# Patient Record
Sex: MALE | Race: WHITE | NOT HISPANIC OR LATINO | Employment: FULL TIME | URBAN - METROPOLITAN AREA
[De-identification: names, ages, dates, MRNs, and addresses within clinical notes are randomized per-mention and may not be internally consistent; named-entity substitution may affect disease eponyms.]

---

## 2023-02-25 PROBLEM — E66.811 CLASS 1 OBESITY WITH BODY MASS INDEX (BMI) OF 30.0 TO 30.9 IN ADULT: Status: ACTIVE | Noted: 2023-02-25

## 2023-02-25 PROBLEM — S46.212A RUPTURE OF LEFT DISTAL BICEPS TENDON: Status: ACTIVE | Noted: 2023-02-25

## 2023-02-25 PROBLEM — E11.9 CONTROLLED DIABETES MELLITUS TYPE II WITHOUT COMPLICATION (MULTI): Status: ACTIVE | Noted: 2023-02-25

## 2023-02-25 PROBLEM — M75.82 ROTATOR CUFF TENDINITIS, LEFT: Status: ACTIVE | Noted: 2023-02-25

## 2023-02-25 PROBLEM — E66.9 CLASS 1 OBESITY WITH BODY MASS INDEX (BMI) OF 30.0 TO 30.9 IN ADULT: Status: ACTIVE | Noted: 2023-02-25

## 2023-02-25 PROBLEM — M75.42 IMPINGEMENT SYNDROME OF LEFT SHOULDER: Status: ACTIVE | Noted: 2023-02-25

## 2023-02-25 PROBLEM — D17.9 LIPOMA: Status: ACTIVE | Noted: 2023-02-25

## 2023-02-25 PROBLEM — M54.50 CHRONIC LOW BACK PAIN: Status: ACTIVE | Noted: 2023-02-25

## 2023-02-25 PROBLEM — G89.29 CHRONIC LOW BACK PAIN: Status: ACTIVE | Noted: 2023-02-25

## 2023-02-25 PROBLEM — E78.5 HYPERLIPIDEMIA, UNSPECIFIED: Status: ACTIVE | Noted: 2023-02-25

## 2023-02-25 RX ORDER — ATORVASTATIN CALCIUM 10 MG/1
1 TABLET, FILM COATED ORAL NIGHTLY
COMMUNITY
Start: 2020-01-21 | End: 2023-03-07 | Stop reason: SDUPTHER

## 2023-02-25 RX ORDER — GLIPIZIDE 5 MG/1
1 TABLET, FILM COATED, EXTENDED RELEASE ORAL
COMMUNITY
Start: 2020-01-21 | End: 2023-03-07 | Stop reason: SDUPTHER

## 2023-02-25 RX ORDER — METFORMIN HYDROCHLORIDE 500 MG/1
1 TABLET, EXTENDED RELEASE ORAL DAILY
COMMUNITY
End: 2023-03-07

## 2023-02-25 RX ORDER — MELOXICAM 15 MG/1
1 TABLET ORAL DAILY
COMMUNITY
End: 2023-03-07 | Stop reason: SDUPTHER

## 2023-02-25 RX ORDER — PEN NEEDLE, DIABETIC 30 GX3/16"
NEEDLE, DISPOSABLE MISCELLANEOUS
COMMUNITY

## 2023-02-25 RX ORDER — INSULIN GLARGINE 100 [IU]/ML
INJECTION, SOLUTION SUBCUTANEOUS
COMMUNITY
Start: 2021-08-19 | End: 2023-03-07 | Stop reason: SDUPTHER

## 2023-02-25 RX ORDER — CYCLOBENZAPRINE HCL 10 MG
1 TABLET ORAL NIGHTLY PRN
COMMUNITY
Start: 2020-05-12 | End: 2023-03-07 | Stop reason: SDUPTHER

## 2023-03-07 ENCOUNTER — OFFICE VISIT (OUTPATIENT)
Dept: PRIMARY CARE | Facility: CLINIC | Age: 48
End: 2023-03-07
Payer: COMMERCIAL

## 2023-03-07 VITALS
TEMPERATURE: 97.7 F | WEIGHT: 217 LBS | HEIGHT: 70 IN | OXYGEN SATURATION: 96 % | RESPIRATION RATE: 16 BRPM | SYSTOLIC BLOOD PRESSURE: 129 MMHG | BODY MASS INDEX: 31.07 KG/M2 | HEART RATE: 86 BPM | DIASTOLIC BLOOD PRESSURE: 91 MMHG

## 2023-03-07 DIAGNOSIS — G89.29 CHRONIC LOW BACK PAIN WITHOUT SCIATICA, UNSPECIFIED BACK PAIN LATERALITY: ICD-10-CM

## 2023-03-07 DIAGNOSIS — E78.5 HYPERLIPIDEMIA, UNSPECIFIED HYPERLIPIDEMIA TYPE: ICD-10-CM

## 2023-03-07 DIAGNOSIS — M54.50 CHRONIC LOW BACK PAIN WITHOUT SCIATICA, UNSPECIFIED BACK PAIN LATERALITY: ICD-10-CM

## 2023-03-07 DIAGNOSIS — E11.9 CONTROLLED TYPE 2 DIABETES MELLITUS WITHOUT COMPLICATION, WITHOUT LONG-TERM CURRENT USE OF INSULIN (MULTI): Primary | ICD-10-CM

## 2023-03-07 DIAGNOSIS — R74.01 ELEVATED AST (SGOT): ICD-10-CM

## 2023-03-07 PROBLEM — M75.82 ROTATOR CUFF TENDINITIS, LEFT: Status: RESOLVED | Noted: 2023-02-25 | Resolved: 2023-03-07

## 2023-03-07 PROBLEM — D17.9 LIPOMA: Status: RESOLVED | Noted: 2023-02-25 | Resolved: 2023-03-07

## 2023-03-07 PROBLEM — M75.42 IMPINGEMENT SYNDROME OF LEFT SHOULDER: Status: RESOLVED | Noted: 2023-02-25 | Resolved: 2023-03-07

## 2023-03-07 PROBLEM — D17.9 LIPOMA: Chronic | Status: ACTIVE | Noted: 2023-02-25

## 2023-03-07 PROBLEM — S46.212A RUPTURE OF LEFT DISTAL BICEPS TENDON: Status: RESOLVED | Noted: 2023-02-25 | Resolved: 2023-03-07

## 2023-03-07 PROCEDURE — 99214 OFFICE O/P EST MOD 30 MIN: CPT

## 2023-03-07 PROCEDURE — 3074F SYST BP LT 130 MM HG: CPT

## 2023-03-07 PROCEDURE — 3080F DIAST BP >= 90 MM HG: CPT

## 2023-03-07 RX ORDER — GLIPIZIDE 5 MG/1
5 TABLET, FILM COATED, EXTENDED RELEASE ORAL
Qty: 90 TABLET | Refills: 1 | Status: SHIPPED | OUTPATIENT
Start: 2023-03-07 | End: 2023-05-10 | Stop reason: SDUPTHER

## 2023-03-07 RX ORDER — MELOXICAM 15 MG/1
15 TABLET ORAL DAILY
Qty: 90 TABLET | Refills: 1 | Status: SHIPPED | OUTPATIENT
Start: 2023-03-07 | End: 2023-05-10 | Stop reason: SDUPTHER

## 2023-03-07 RX ORDER — METFORMIN HYDROCHLORIDE 500 MG/1
500 TABLET, EXTENDED RELEASE ORAL DAILY
Qty: 90 TABLET | Refills: 1 | Status: CANCELLED | OUTPATIENT
Start: 2023-03-07

## 2023-03-07 RX ORDER — METFORMIN HYDROCHLORIDE 1000 MG/1
1000 TABLET ORAL
Qty: 180 TABLET | Refills: 3 | Status: SHIPPED | OUTPATIENT
Start: 2023-03-07 | End: 2024-01-22 | Stop reason: SDUPTHER

## 2023-03-07 RX ORDER — ATORVASTATIN CALCIUM 10 MG/1
10 TABLET, FILM COATED ORAL NIGHTLY
Qty: 90 TABLET | Refills: 1 | Status: SHIPPED | OUTPATIENT
Start: 2023-03-07 | End: 2023-10-11

## 2023-03-07 RX ORDER — INSULIN GLARGINE 100 [IU]/ML
60 INJECTION, SOLUTION SUBCUTANEOUS NIGHTLY
Qty: 15 EACH | Refills: 1 | Status: SHIPPED | OUTPATIENT
Start: 2023-03-07 | End: 2023-05-10 | Stop reason: SDUPTHER

## 2023-03-07 RX ORDER — CYCLOBENZAPRINE HCL 10 MG
10 TABLET ORAL NIGHTLY PRN
Qty: 90 TABLET | Refills: 1 | Status: SHIPPED | OUTPATIENT
Start: 2023-03-07 | End: 2024-04-23 | Stop reason: SDUPTHER

## 2023-03-07 ASSESSMENT — LIFESTYLE VARIABLES
AUDIT-C TOTAL SCORE: 3
HOW OFTEN DO YOU HAVE A DRINK CONTAINING ALCOHOL: 2-3 TIMES A WEEK
HOW MANY STANDARD DRINKS CONTAINING ALCOHOL DO YOU HAVE ON A TYPICAL DAY: 1 OR 2
SKIP TO QUESTIONS 9-10: 1
HOW OFTEN DO YOU HAVE SIX OR MORE DRINKS ON ONE OCCASION: NEVER

## 2023-03-07 ASSESSMENT — PATIENT HEALTH QUESTIONNAIRE - PHQ9
2. FEELING DOWN, DEPRESSED OR HOPELESS: NOT AT ALL
SUM OF ALL RESPONSES TO PHQ9 QUESTIONS 1 & 2: 0
1. LITTLE INTEREST OR PLEASURE IN DOING THINGS: NOT AT ALL

## 2023-03-07 ASSESSMENT — ENCOUNTER SYMPTOMS
CARDIOVASCULAR NEGATIVE: 1
LOSS OF SENSATION IN FEET: 0
OCCASIONAL FEELINGS OF UNSTEADINESS: 0
MUSCULOSKELETAL NEGATIVE: 1
DIABETIC ASSOCIATED SYMPTOMS: 0
PSYCHIATRIC NEGATIVE: 1
ENDOCRINE NEGATIVE: 1
NEUROLOGICAL NEGATIVE: 1
EYES NEGATIVE: 1
CONSTITUTIONAL NEGATIVE: 1
GASTROINTESTINAL NEGATIVE: 1
HEMATOLOGIC/LYMPHATIC NEGATIVE: 1
RESPIRATORY NEGATIVE: 1
DEPRESSION: 0

## 2023-03-07 NOTE — PATIENT INSTRUCTIONS
Thank you for coming to see me today.  If you have any questions or concerns following our visit, please contact the office.  Phone: [phone]    Follow up with me in 4-6 weeks or sooner as needed    1) START metformin 1000 mg twice a day with meals    2) DECREASE lantus to 45 units nightly    3) Get fasting labs within the next week

## 2023-03-07 NOTE — PROGRESS NOTES
"Subjective   Patient ID: Herman Fairbanks is a 47 y.o. male who presents for visit to establish care, hyperlipidemia and DM2 follow up.     Diabetes  He presents for his follow-up diabetic visit. He has type 2 diabetes mellitus. The initial diagnosis of diabetes was made 4 years ago. His disease course has been stable. There are no hypoglycemic associated symptoms. There are no diabetic associated symptoms. There are no hypoglycemic complications. Symptoms are stable.      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 Occasional smoked meats and chicken wings. Occasional candy, avoiding sodas  Exercise: On his feet a lot at work, cuts grass and shovels snow on his property. Walks the dogs  Sleep: Not as good lately, getting about 4 hours then up to use bathroom then sometimes having trouble getting back to sleep. 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    HENT: Negative.     Eyes: Negative.    Respiratory: Negative.     Cardiovascular: Negative.    Gastrointestinal: Negative.    Endocrine: Negative.    Genitourinary: Negative.    Musculoskeletal: Negative.    Skin: Negative.    Neurological: Negative.    Hematological: Negative.    Psychiatric/Behavioral: Negative.         Objective   BP (!) 129/91 (BP Location: Left arm, Patient Position: Sitting, BP Cuff Size: Large adult)   Pulse 86   Temp 36.5 °C (97.7 °F)   Resp 16   Ht 1.765 m (5' 9.5\")   Wt 98.4 kg (217 lb)   SpO2 96%   BMI 31.59 kg/m²     Physical Exam  HENT:      Head: Normocephalic and " atraumatic.      Right Ear: Tympanic membrane and ear canal normal.      Left Ear: Tympanic membrane and ear canal normal.      Nose: Nose normal. No congestion or rhinorrhea.   Eyes:      Extraocular Movements: Extraocular movements intact.      Conjunctiva/sclera: Conjunctivae normal.      Pupils: Pupils are equal, round, and reactive to light.   Neck:      Vascular: No carotid bruit.   Cardiovascular:      Rate and Rhythm: Normal rate and regular rhythm.      Pulses: Normal pulses.      Heart sounds: Normal heart sounds.   Pulmonary:      Effort: Pulmonary effort is normal.      Breath sounds: Normal breath sounds.   Abdominal:      General: Bowel sounds are normal.      Palpations: Abdomen is soft.   Musculoskeletal:         General: Normal range of motion.      Cervical back: Normal range of motion.      Comments: Right middle finger DIP amputated   Skin:     General: Skin is warm and dry.   Neurological:      General: No focal deficit present.      Mental Status: He is alert and oriented to person, place, and time.   Psychiatric:         Mood and Affect: Mood normal.         Behavior: Behavior normal.         Assessment/Plan   Problem List Items Addressed This Visit       Chronic low back pain     Well controlled on cyclobenzaprine PRN and meloxicam 15mg daily         Relevant Medications    cyclobenzaprine (Flexeril) 10 mg tablet    meloxicam (Mobic) 15 mg tablet    Controlled diabetes mellitus type II without complication (CMS/HCC) - Primary     Increase metformin to 1000mg twice a day  Decrease lantus to 45 units daily currently taking 60 units daily  Will try to get him off insulin and on better GDMT for DM2 such as SGLT2i and GLP-1 RA         Relevant Medications    atorvastatin (Lipitor) 10 mg tablet    glipiZIDE XL (Glucotrol XL) 5 mg 24 hr tablet    insulin glargine (Lantus) 100 unit/mL (3 mL) pen    metFORMIN (Glucophage) 1,000 mg tablet    Other Relevant Orders    Follow Up In Primary Care     Hemoglobin A1c    CBC    Comprehensive Metabolic Panel    Hyperlipidemia, unspecified     Continue atorvastatin 10mg daily  Repeat fasting lipid panel         Relevant Medications    atorvastatin (Lipitor) 10 mg tablet    Other Relevant Orders    Lipid Panel

## 2023-03-07 NOTE — ASSESSMENT & PLAN NOTE
Increase metformin to 1000mg twice a day  Decrease lantus to 45 units daily currently taking 60 units daily  Will try to get him off insulin and on better GDMT for DM2 such as SGLT2i and GLP-1 RA

## 2023-03-09 ENCOUNTER — LAB (OUTPATIENT)
Dept: LAB | Facility: LAB | Age: 48
End: 2023-03-09
Payer: COMMERCIAL

## 2023-03-09 ENCOUNTER — APPOINTMENT (OUTPATIENT)
Dept: LAB | Facility: LAB | Age: 48
End: 2023-03-09
Payer: COMMERCIAL

## 2023-03-09 ENCOUNTER — TELEPHONE (OUTPATIENT)
Dept: PRIMARY CARE | Facility: CLINIC | Age: 48
End: 2023-03-09

## 2023-03-09 DIAGNOSIS — E11.9 CONTROLLED TYPE 2 DIABETES MELLITUS WITHOUT COMPLICATION, WITHOUT LONG-TERM CURRENT USE OF INSULIN (MULTI): ICD-10-CM

## 2023-03-09 LAB
ALANINE AMINOTRANSFERASE (SGPT) (U/L) IN SER/PLAS: 86 U/L (ref 10–52)
ALBUMIN (G/DL) IN SER/PLAS: 4.5 G/DL (ref 3.4–5)
ALKALINE PHOSPHATASE (U/L) IN SER/PLAS: 43 U/L (ref 33–120)
ANION GAP IN SER/PLAS: 10 MMOL/L (ref 10–20)
ASPARTATE AMINOTRANSFERASE (SGOT) (U/L) IN SER/PLAS: 44 U/L (ref 9–39)
BILIRUBIN TOTAL (MG/DL) IN SER/PLAS: 0.8 MG/DL (ref 0–1.2)
CALCIUM (MG/DL) IN SER/PLAS: 9.3 MG/DL (ref 8.6–10.3)
CARBON DIOXIDE, TOTAL (MMOL/L) IN SER/PLAS: 25 MMOL/L (ref 21–32)
CHLORIDE (MMOL/L) IN SER/PLAS: 105 MMOL/L (ref 98–107)
CHOLESTEROL (MG/DL) IN SER/PLAS: 150 MG/DL (ref 0–199)
CHOLESTEROL IN HDL (MG/DL) IN SER/PLAS: 46.9 MG/DL
CHOLESTEROL/HDL RATIO: 3.2
CREATININE (MG/DL) IN SER/PLAS: 0.9 MG/DL (ref 0.5–1.3)
ERYTHROCYTE DISTRIBUTION WIDTH (RATIO) BY AUTOMATED COUNT: 13.1 % (ref 11.5–14.5)
ERYTHROCYTE MEAN CORPUSCULAR HEMOGLOBIN CONCENTRATION (G/DL) BY AUTOMATED: 33.8 G/DL (ref 32–36)
ERYTHROCYTE MEAN CORPUSCULAR VOLUME (FL) BY AUTOMATED COUNT: 88 FL (ref 80–100)
ERYTHROCYTES (10*6/UL) IN BLOOD BY AUTOMATED COUNT: 5.7 X10E12/L (ref 4.5–5.9)
GFR MALE: >90 ML/MIN/1.73M2
GLUCOSE (MG/DL) IN SER/PLAS: 150 MG/DL (ref 74–99)
HEMATOCRIT (%) IN BLOOD BY AUTOMATED COUNT: 50 % (ref 41–52)
HEMOGLOBIN (G/DL) IN BLOOD: 16.9 G/DL (ref 13.5–17.5)
LDL: 84 MG/DL (ref 0–99)
LEUKOCYTES (10*3/UL) IN BLOOD BY AUTOMATED COUNT: 6.1 X10E9/L (ref 4.4–11.3)
PLATELETS (10*3/UL) IN BLOOD AUTOMATED COUNT: 169 X10E9/L (ref 150–450)
POTASSIUM (MMOL/L) IN SER/PLAS: 4.2 MMOL/L (ref 3.5–5.3)
PROTEIN TOTAL: 7.2 G/DL (ref 6.4–8.2)
SODIUM (MMOL/L) IN SER/PLAS: 136 MMOL/L (ref 136–145)
TRIGLYCERIDE (MG/DL) IN SER/PLAS: 94 MG/DL (ref 0–149)
UREA NITROGEN (MG/DL) IN SER/PLAS: 21 MG/DL (ref 6–23)
VLDL: 19 MG/DL (ref 0–40)

## 2023-03-09 PROCEDURE — 80061 LIPID PANEL: CPT

## 2023-03-09 PROCEDURE — 83036 HEMOGLOBIN GLYCOSYLATED A1C: CPT

## 2023-03-09 PROCEDURE — 85027 COMPLETE CBC AUTOMATED: CPT

## 2023-03-09 PROCEDURE — 80053 COMPREHEN METABOLIC PANEL: CPT

## 2023-03-09 PROCEDURE — 36415 COLL VENOUS BLD VENIPUNCTURE: CPT

## 2023-03-09 NOTE — TELEPHONE ENCOUNTER
Pt came into office stated he needs his test strips for glucose meter, meter is true Metrix.   Pt's pharamacy is [facility] in [place].

## 2023-03-10 DIAGNOSIS — E11.9 CONTROLLED TYPE 2 DIABETES MELLITUS WITHOUT COMPLICATION, WITHOUT LONG-TERM CURRENT USE OF INSULIN (MULTI): Primary | ICD-10-CM

## 2023-03-10 LAB
ESTIMATED AVERAGE GLUCOSE FOR HBA1C: 160 MG/DL
HEMOGLOBIN A1C/HEMOGLOBIN TOTAL IN BLOOD: 7.2 %

## 2023-03-10 NOTE — RESULT ENCOUNTER NOTE
Please let him know A1c elevated to 7.2- needs to work on getting more exercise and cutting out sugary foods.  Still want him to increase metformin and decrease lantus as discussed in office due to low glucose overnight/in the morning

## 2023-04-17 ENCOUNTER — LAB (OUTPATIENT)
Dept: LAB | Facility: LAB | Age: 48
End: 2023-04-17
Payer: COMMERCIAL

## 2023-04-17 DIAGNOSIS — R74.01 ELEVATED AST (SGOT): ICD-10-CM

## 2023-04-17 LAB
ALANINE AMINOTRANSFERASE (SGPT) (U/L) IN SER/PLAS: 87 U/L (ref 10–52)
ALBUMIN (G/DL) IN SER/PLAS: 4.4 G/DL (ref 3.4–5)
ALKALINE PHOSPHATASE (U/L) IN SER/PLAS: 45 U/L (ref 33–120)
ASPARTATE AMINOTRANSFERASE (SGOT) (U/L) IN SER/PLAS: 35 U/L (ref 9–39)
BILIRUBIN DIRECT (MG/DL) IN SER/PLAS: 0.1 MG/DL (ref 0–0.3)
BILIRUBIN TOTAL (MG/DL) IN SER/PLAS: 0.6 MG/DL (ref 0–1.2)
PROTEIN TOTAL: 7 G/DL (ref 6.4–8.2)

## 2023-04-17 PROCEDURE — 80076 HEPATIC FUNCTION PANEL: CPT

## 2023-04-17 PROCEDURE — 36415 COLL VENOUS BLD VENIPUNCTURE: CPT

## 2023-04-18 ENCOUNTER — APPOINTMENT (OUTPATIENT)
Dept: PRIMARY CARE | Facility: CLINIC | Age: 48
End: 2023-04-18
Payer: COMMERCIAL

## 2023-05-10 ENCOUNTER — OFFICE VISIT (OUTPATIENT)
Dept: PRIMARY CARE | Facility: CLINIC | Age: 48
End: 2023-05-10
Payer: COMMERCIAL

## 2023-05-10 VITALS
WEIGHT: 224 LBS | DIASTOLIC BLOOD PRESSURE: 80 MMHG | SYSTOLIC BLOOD PRESSURE: 135 MMHG | TEMPERATURE: 97.4 F | HEIGHT: 70 IN | RESPIRATION RATE: 16 BRPM | OXYGEN SATURATION: 99 % | HEART RATE: 78 BPM | BODY MASS INDEX: 32.07 KG/M2

## 2023-05-10 DIAGNOSIS — E11.9 CONTROLLED TYPE 2 DIABETES MELLITUS WITHOUT COMPLICATION, WITHOUT LONG-TERM CURRENT USE OF INSULIN (MULTI): ICD-10-CM

## 2023-05-10 DIAGNOSIS — G89.29 CHRONIC LOW BACK PAIN WITHOUT SCIATICA, UNSPECIFIED BACK PAIN LATERALITY: ICD-10-CM

## 2023-05-10 DIAGNOSIS — M54.50 CHRONIC LOW BACK PAIN WITHOUT SCIATICA, UNSPECIFIED BACK PAIN LATERALITY: ICD-10-CM

## 2023-05-10 PROCEDURE — 3079F DIAST BP 80-89 MM HG: CPT

## 2023-05-10 PROCEDURE — 99213 OFFICE O/P EST LOW 20 MIN: CPT

## 2023-05-10 PROCEDURE — 95250 CONT GLUC MNTR PHYS/QHP EQP: CPT

## 2023-05-10 PROCEDURE — 3051F HG A1C>EQUAL 7.0%<8.0%: CPT

## 2023-05-10 PROCEDURE — 3075F SYST BP GE 130 - 139MM HG: CPT

## 2023-05-10 RX ORDER — GLIPIZIDE 10 MG/1
10 TABLET, FILM COATED, EXTENDED RELEASE ORAL DAILY
Qty: 30 TABLET | Refills: 11 | Status: SHIPPED | OUTPATIENT
Start: 2023-05-10 | End: 2024-01-22 | Stop reason: SDUPTHER

## 2023-05-10 RX ORDER — INSULIN GLARGINE 100 [IU]/ML
30 INJECTION, SOLUTION SUBCUTANEOUS NIGHTLY
Qty: 12 EACH | Refills: 3 | Status: SHIPPED | OUTPATIENT
Start: 2023-05-10 | End: 2023-08-17 | Stop reason: SDUPTHER

## 2023-05-10 RX ORDER — GLIPIZIDE 5 MG/1
10 TABLET, FILM COATED, EXTENDED RELEASE ORAL
Qty: 90 TABLET | Refills: 1 | Status: SHIPPED | OUTPATIENT
Start: 2023-05-10 | End: 2023-05-10 | Stop reason: ALTCHOICE

## 2023-05-10 RX ORDER — FLASH GLUCOSE SENSOR
KIT MISCELLANEOUS
Qty: 2 EACH | Refills: 11 | Status: SHIPPED | OUTPATIENT
Start: 2023-05-10 | End: 2023-06-08 | Stop reason: SDUPTHER

## 2023-05-10 RX ORDER — MELOXICAM 15 MG/1
15 TABLET ORAL DAILY
Qty: 90 TABLET | Refills: 1 | Status: SHIPPED | OUTPATIENT
Start: 2023-05-10 | End: 2024-01-22 | Stop reason: SDUPTHER

## 2023-05-10 ASSESSMENT — ENCOUNTER SYMPTOMS
CARDIOVASCULAR NEGATIVE: 1
EYES NEGATIVE: 1
NEUROLOGICAL NEGATIVE: 1
HEMATOLOGIC/LYMPHATIC NEGATIVE: 1
PSYCHIATRIC NEGATIVE: 1
RESPIRATORY NEGATIVE: 1
ENDOCRINE NEGATIVE: 1
GASTROINTESTINAL NEGATIVE: 1
CONSTITUTIONAL NEGATIVE: 1
MUSCULOSKELETAL NEGATIVE: 1

## 2023-05-10 ASSESSMENT — PAIN SCALES - GENERAL: PAINLEVEL: 0-NO PAIN

## 2023-05-10 NOTE — PATIENT INSTRUCTIONS
Thank you for coming to see me today.  If you have any questions or concerns following our visit, please contact the office.  Phone: [phone]    Follow up with me in 6-8 weeks, call me/message me if issues with glucose prior to appointment    1)  DECREASE lantus to 30 units daily    2)  INCREASE Glipizide to 10mg daily

## 2023-05-10 NOTE — ASSESSMENT & PLAN NOTE
CGM FreeStyle Wili 3 started in-office today, will send order for sensors to pharmacy  Decrease lantus to 30 units daily  Increase glipizide to 10mg daily    **I spent 15 minutes providing individualized patient education regarding R/B/A of CGM, sensor placement, hook-up, monitoring, patient training, and sensor removal with the patient.

## 2023-05-10 NOTE — PROGRESS NOTES
"Subjective   Patient ID: Herman Fairbanks is a 47 y.o. male who presents for follow up of hyperglycemia.    Glucose readings running 140-150s at home since adding in metformin 1000mg BID and decreasing basaglar insulin to 45 units nightly.    Diet: Eating salads, wife cooks pretty healthy. Occasional smoked meats and chicken wings. Occasional candy, avoiding sodas  Exercise: On his feet a lot at work, cuts grass and shovels snow on his property. Walks the dogs  Sleep: Not as good lately, getting about 4 hours then up to use bathroom then sometimes having trouble getting back to sleep. 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    HENT: Negative.     Eyes: Negative.    Respiratory: Negative.     Cardiovascular: Negative.    Gastrointestinal: Negative.    Endocrine: Negative.    Genitourinary: Negative.    Musculoskeletal: Negative.    Skin: Negative.    Neurological: Negative.    Hematological: Negative.    Psychiatric/Behavioral: Negative.          Current Outpatient Medications   Medication Sig Dispense Refill    atorvastatin (Lipitor) 10 mg tablet Take 1 tablet (10 mg) by mouth once daily at bedtime. 90 tablet 1    blood sugar diagnostic strip For TrueMetrix glucometer. Check blood glucose daily as directed 100 strip 3    cyclobenzaprine (Flexeril) 10 mg tablet Take 1 tablet (10 mg) by mouth as needed at bedtime for muscle spasms. 90 tablet 1    metFORMIN (Glucophage) 1,000 mg tablet Take 1 tablet (1,000 mg) by mouth in the morning and 1 tablet (1,000 mg) in the evening. Take with meals. 180 tablet 3    pen needle, diabetic 32 gauge x 5/32\" needle To be used with lantus injections daily      FreeStyle Wili sensor system (FreeStyle Wili 14 Day Sensor) kit FreeStyle Wili 3. Use as instructed 2 each 11    glipiZIDE XL (Glucotrol XL) 10 mg 24 hr " "tablet Take 1 tablet (10 mg) by mouth once daily. Do not crush, chew, or split. 30 tablet 11    insulin glargine (Lantus) 100 unit/mL (3 mL) pen Inject 30 Units under the skin once daily at bedtime. 12 each 3    meloxicam (Mobic) 15 mg tablet Take 1 tablet (15 mg) by mouth once daily. 90 tablet 1     No current facility-administered medications for this visit.     Past Surgical History:   Procedure Laterality Date    CIRCUMCISION, PRIMARY  Sept77    OTHER SURGICAL HISTORY  01/02/2020    Tonsillectomy    OTHER SURGICAL HISTORY  08/17/2021    Biceps tendon rupture repair     Family History   Problem Relation Name Age of Onset    Hyperlipidemia Mother M     Hypertension Father D     Diabetes Maternal Grandmother G     Heart attack Maternal Grandfather      Sudden death Maternal Grandfather      Diabetes Paternal Grandmother G       Social History     Tobacco Use    Smoking status: Some Days     Types: Cigars    Smokeless tobacco: Former   Vaping Use    Vaping status: Never Used   Substance Use Topics    Alcohol use: Yes     Alcohol/week: 5.0 standard drinks of alcohol     Types: 5 Cans of beer per week    Drug use: Never        Objective     Visit Vitals  /80 (BP Location: Right arm, Patient Position: Sitting, BP Cuff Size: Adult)   Pulse 78   Temp 36.3 °C (97.4 °F) (Temporal)   Resp 16   Ht 1.778 m (5' 10\")   Wt 102 kg (224 lb)   SpO2 99%   BMI 32.14 kg/m²   Smoking Status Some Days   BSA 2.24 m²        Physical Exam  Constitutional:       Appearance: Normal appearance.   HENT:      Head: Normocephalic and atraumatic.   Cardiovascular:      Rate and Rhythm: Normal rate and regular rhythm.      Pulses: Normal pulses.      Heart sounds: Normal heart sounds.   Pulmonary:      Effort: Pulmonary effort is normal.      Breath sounds: Normal breath sounds.   Abdominal:      General: Abdomen is flat. Bowel sounds are normal.      Palpations: Abdomen is soft.   Musculoskeletal:         General: Normal range of motion. "   Neurological:      General: No focal deficit present.      Mental Status: He is alert and oriented to person, place, and time.   Psychiatric:         Mood and Affect: Mood normal.         Behavior: Behavior normal.           Assessment/Plan   Problem List Items Addressed This Visit       Chronic low back pain    Relevant Medications    meloxicam (Mobic) 15 mg tablet    Controlled diabetes mellitus type II without complication (CMS/HCC)     CGM FreeStyle Wili 3 started in-office today, will send order for sensors to pharmacy  Decrease lantus to 30 units daily  Increase glipizide to 10mg daily    **I spent 15 minutes providing individualized patient education regarding R/B/A of CGM, sensor placement, hook-up, monitoring, patient training, and sensor removal with the patient.           Relevant Medications    insulin glargine (Lantus) 100 unit/mL (3 mL) pen    FreeStyle Wili sensor system (FreeStyle Wili 14 Day Sensor) kit    glipiZIDE XL (Glucotrol XL) 10 mg 24 hr tablet    Other Relevant Orders    Follow Up In Primary Care       All pertinent lab work and results were reviewed with patient.     Follow up with me in 6-8 weeks     AVNI Weber-CNS

## 2023-05-24 ENCOUNTER — APPOINTMENT (OUTPATIENT)
Dept: PRIMARY CARE | Facility: CLINIC | Age: 48
End: 2023-05-24
Payer: COMMERCIAL

## 2023-06-05 ENCOUNTER — TELEPHONE (OUTPATIENT)
Dept: PRIMARY CARE | Facility: CLINIC | Age: 48
End: 2023-06-05
Payer: COMMERCIAL

## 2023-06-05 DIAGNOSIS — E11.9 CONTROLLED TYPE 2 DIABETES MELLITUS WITHOUT COMPLICATION, WITHOUT LONG-TERM CURRENT USE OF INSULIN (MULTI): ICD-10-CM

## 2023-06-05 NOTE — TELEPHONE ENCOUNTER
Prior authorization request received via fax for [employer]    Form given to: PLACED IN LEAD MA'S BOX ON 6/5/23

## 2023-06-08 RX ORDER — FLASH GLUCOSE SENSOR
KIT MISCELLANEOUS
Qty: 2 EACH | Refills: 11 | Status: SHIPPED | OUTPATIENT
Start: 2023-06-08 | End: 2023-06-22 | Stop reason: SDUPTHER

## 2023-07-17 ENCOUNTER — OFFICE VISIT (OUTPATIENT)
Dept: PRIMARY CARE | Facility: CLINIC | Age: 48
End: 2023-07-17
Payer: COMMERCIAL

## 2023-07-17 ENCOUNTER — LAB (OUTPATIENT)
Dept: LAB | Facility: LAB | Age: 48
End: 2023-07-17
Payer: COMMERCIAL

## 2023-07-17 VITALS
OXYGEN SATURATION: 99 % | BODY MASS INDEX: 32.28 KG/M2 | RESPIRATION RATE: 16 BRPM | TEMPERATURE: 96.4 F | SYSTOLIC BLOOD PRESSURE: 121 MMHG | DIASTOLIC BLOOD PRESSURE: 80 MMHG | HEART RATE: 74 BPM | WEIGHT: 225 LBS

## 2023-07-17 DIAGNOSIS — E11.9 CONTROLLED TYPE 2 DIABETES MELLITUS WITHOUT COMPLICATION, WITHOUT LONG-TERM CURRENT USE OF INSULIN (MULTI): ICD-10-CM

## 2023-07-17 DIAGNOSIS — R74.01 ELEVATED AST (SGOT): ICD-10-CM

## 2023-07-17 DIAGNOSIS — R74.01 ELEVATED AST (SGOT): Primary | ICD-10-CM

## 2023-07-17 LAB
ALANINE AMINOTRANSFERASE (SGPT) (U/L) IN SER/PLAS: 161 U/L (ref 10–52)
ALBUMIN (G/DL) IN SER/PLAS: 4.5 G/DL (ref 3.4–5)
ALBUMIN (MG/L) IN URINE: 9.8 MG/L
ALBUMIN/CREATININE (UG/MG) IN URINE: 4.9 UG/MG CRT (ref 0–30)
ALKALINE PHOSPHATASE (U/L) IN SER/PLAS: 49 U/L (ref 33–120)
ANION GAP IN SER/PLAS: 13 MMOL/L (ref 10–20)
ASPARTATE AMINOTRANSFERASE (SGOT) (U/L) IN SER/PLAS: 70 U/L (ref 9–39)
BILIRUBIN TOTAL (MG/DL) IN SER/PLAS: 0.5 MG/DL (ref 0–1.2)
CALCIUM (MG/DL) IN SER/PLAS: 9.2 MG/DL (ref 8.6–10.3)
CARBON DIOXIDE, TOTAL (MMOL/L) IN SER/PLAS: 27 MMOL/L (ref 21–32)
CHLORIDE (MMOL/L) IN SER/PLAS: 103 MMOL/L (ref 98–107)
CREATININE (MG/DL) IN SER/PLAS: 1.09 MG/DL (ref 0.5–1.3)
CREATININE (MG/DL) IN URINE: 199 MG/DL (ref 20–370)
ESTIMATED AVERAGE GLUCOSE FOR HBA1C: 174 MG/DL
GFR MALE: 85 ML/MIN/1.73M2
GLUCOSE (MG/DL) IN SER/PLAS: 172 MG/DL (ref 74–99)
HEMOGLOBIN A1C/HEMOGLOBIN TOTAL IN BLOOD: 7.7 %
POTASSIUM (MMOL/L) IN SER/PLAS: 4.6 MMOL/L (ref 3.5–5.3)
PROTEIN TOTAL: 7.1 G/DL (ref 6.4–8.2)
SODIUM (MMOL/L) IN SER/PLAS: 138 MMOL/L (ref 136–145)
UREA NITROGEN (MG/DL) IN SER/PLAS: 19 MG/DL (ref 6–23)

## 2023-07-17 PROCEDURE — 36415 COLL VENOUS BLD VENIPUNCTURE: CPT

## 2023-07-17 PROCEDURE — 4004F PT TOBACCO SCREEN RCVD TLK: CPT

## 2023-07-17 PROCEDURE — 3051F HG A1C>EQUAL 7.0%<8.0%: CPT

## 2023-07-17 PROCEDURE — 3074F SYST BP LT 130 MM HG: CPT

## 2023-07-17 PROCEDURE — 83036 HEMOGLOBIN GLYCOSYLATED A1C: CPT

## 2023-07-17 PROCEDURE — 80053 COMPREHEN METABOLIC PANEL: CPT

## 2023-07-17 PROCEDURE — 3079F DIAST BP 80-89 MM HG: CPT

## 2023-07-17 PROCEDURE — 82570 ASSAY OF URINE CREATININE: CPT

## 2023-07-17 PROCEDURE — 99214 OFFICE O/P EST MOD 30 MIN: CPT

## 2023-07-17 PROCEDURE — 82043 UR ALBUMIN QUANTITATIVE: CPT

## 2023-07-17 RX ORDER — BLOOD-GLUCOSE SENSOR
EACH MISCELLANEOUS
Qty: 3 EACH | Refills: 11 | Status: SHIPPED | OUTPATIENT
Start: 2023-07-17 | End: 2023-08-10 | Stop reason: SDUPTHER

## 2023-07-17 RX ORDER — FLASH GLUCOSE SENSOR
KIT MISCELLANEOUS
Qty: 2 EACH | Refills: 11 | Status: CANCELLED | OUTPATIENT
Start: 2023-07-17

## 2023-07-17 ASSESSMENT — ENCOUNTER SYMPTOMS
NEUROLOGICAL NEGATIVE: 1
ENDOCRINE NEGATIVE: 1
PSYCHIATRIC NEGATIVE: 1
EYES NEGATIVE: 1
GASTROINTESTINAL NEGATIVE: 1
RESPIRATORY NEGATIVE: 1
CARDIOVASCULAR NEGATIVE: 1
HEMATOLOGIC/LYMPHATIC NEGATIVE: 1
CONSTITUTIONAL NEGATIVE: 1
MUSCULOSKELETAL NEGATIVE: 1

## 2023-07-17 ASSESSMENT — PAIN SCALES - GENERAL: PAINLEVEL: 0-NO PAIN

## 2023-07-17 NOTE — PATIENT INSTRUCTIONS
Thank you for coming to see me today.  If you have any questions or concerns following our visit, please contact the office.  Phone: [phone]    Follow up with me in 3 months or sooner as needed    1)  START Dexcom G7 sensors, apply every 10 days    2)  Get non-fasting labwork today.  The lab is down the [address] from our office.     3) No more than 0-1 standard drinks per day

## 2023-07-17 NOTE — ASSESSMENT & PLAN NOTE
Home sugar checked once in the last few weeks was 170s first thing in the morning. FreeStyle sensors were falling off after 3-5 days. Only had 1 sensor that lasted all 14 days. Tried cleaning arm before use, adding tegaderm wrap on top; unsuccessfully    Switch to Dexcom G7 sensor applied to abdomen

## 2023-07-17 NOTE — PROGRESS NOTES
Subjective   Patient ID: Herman Fairbanks is a 47 y.o. male who presents for DM2 follow up.    Reports FreeStyle Wili sensors are frequently falling off, has been without sensors for a few weeks.     Diet: Eating salads, wife cooks pretty healthy. Occasional smoked meats and chicken wings. Occasional candy, avoiding sodas  Exercise: On his feet a lot at work, cuts grass and shovels snow on his property. Walks the dogs  Sleep: Not as good lately, getting about 4 hours then up to use bathroom then sometimes having trouble getting back to sleep. 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    HENT: Negative.     Eyes: Negative.    Respiratory: Negative.     Cardiovascular: Negative.    Gastrointestinal: Negative.    Endocrine: Negative.    Genitourinary: Negative.    Musculoskeletal: Negative.    Skin: Negative.    Neurological: Negative.    Hematological: Negative.    Psychiatric/Behavioral: Negative.          Current Outpatient Medications   Medication Sig Dispense Refill    atorvastatin (Lipitor) 10 mg tablet Take 1 tablet (10 mg) by mouth once daily at bedtime. 90 tablet 1    blood sugar diagnostic strip For TrueMetrix glucometer. Check blood glucose daily as directed 100 strip 3    cyclobenzaprine (Flexeril) 10 mg tablet Take 1 tablet (10 mg) by mouth as needed at bedtime for muscle spasms. 90 tablet 1    glipiZIDE XL (Glucotrol XL) 10 mg 24 hr tablet Take 1 tablet (10 mg) by mouth once daily. Do not crush, chew, or split. 30 tablet 11    insulin glargine (Lantus) 100 unit/mL (3 mL) pen Inject 30 Units under the skin once daily at bedtime. 12 each 3    meloxicam (Mobic) 15 mg tablet Take 1 tablet (15 mg) by mouth once daily. 90 tablet 1    metFORMIN (Glucophage) 1,000 mg tablet Take 1 tablet (1,000 mg) by mouth in the morning and 1 tablet (1,000  "mg) in the evening. Take with meals. 180 tablet 3    pen needle, diabetic 32 gauge x 5/32\" needle To be used with lantus injections daily      blood-glucose sensor (Dexcom G7 Sensor) device Place 1 sensor on arm or abdomen every 10 days 3 each 11     No current facility-administered medications for this visit.     Past Surgical History:   Procedure Laterality Date    CIRCUMCISION, PRIMARY  Sept77    OTHER SURGICAL HISTORY  01/02/2020    Tonsillectomy    OTHER SURGICAL HISTORY  08/17/2021    Biceps tendon rupture repair     Family History   Problem Relation Name Age of Onset    Hyperlipidemia Mother M     Hypertension Father D     Diabetes Maternal Grandmother G     Heart attack Maternal Grandfather      Sudden death Maternal Grandfather      Diabetes Paternal Grandmother G       Social History     Tobacco Use    Smoking status: Some Days     Types: Cigars    Smokeless tobacco: Never   Vaping Use    Vaping Use: Never used   Substance Use Topics    Alcohol use: Yes     Alcohol/week: 5.0 standard drinks of alcohol     Types: 5 Cans of beer per week    Drug use: Never        Objective     Visit Vitals  /80 (BP Location: Left arm, Patient Position: Sitting, BP Cuff Size: Adult)   Pulse 74   Temp 35.8 °C (96.4 °F) (Temporal)   Resp 16   Wt 102 kg (225 lb)   SpO2 99%   BMI 32.28 kg/m²   Smoking Status Some Days   BSA 2.24 m²        Physical Exam  Constitutional:       Appearance: Normal appearance.   HENT:      Head: Normocephalic and atraumatic.   Eyes:      Extraocular Movements: Extraocular movements intact.      Pupils: Pupils are equal, round, and reactive to light.   Cardiovascular:      Rate and Rhythm: Normal rate and regular rhythm.   Pulmonary:      Effort: Pulmonary effort is normal.      Breath sounds: Normal breath sounds.   Abdominal:      General: Abdomen is flat. Bowel sounds are normal.      Palpations: Abdomen is soft.   Musculoskeletal:         General: Normal range of motion.   Neurological:      " General: No focal deficit present.      Mental Status: He is alert and oriented to person, place, and time.   Psychiatric:         Mood and Affect: Mood normal.         Behavior: Behavior normal.           Assessment/Plan   Problem List Items Addressed This Visit       Controlled diabetes mellitus type II without complication (CMS/HCC)     Home sugar checked once in the last few weeks was 170s first thing in the morning. FreeStyle sensors were falling off after 3-5 days. Only had 1 sensor that lasted all 14 days. Tried cleaning arm before use, adding tegaderm wrap on top; unsuccessfully    Switch to Dexcom G7 sensor applied to abdomen           Relevant Medications    blood-glucose sensor (Dexcom G7 Sensor) device    Other Relevant Orders    Hemoglobin A1C    Albumin , Urine Random     Other Visit Diagnoses       Elevated AST (SGOT)    -  Primary    Relevant Orders    Comprehensive Metabolic Panel            All pertinent lab work and results were reviewed with patient.     Follow up with me in 3 months or sooner as needed    AVNI Weber-CNS

## 2023-07-28 ENCOUNTER — PATIENT MESSAGE (OUTPATIENT)
Dept: PRIMARY CARE | Facility: CLINIC | Age: 48
End: 2023-07-28
Payer: COMMERCIAL

## 2023-07-28 DIAGNOSIS — R74.01 ELEVATED AST (SGOT): ICD-10-CM

## 2023-07-28 DIAGNOSIS — E11.9 CONTROLLED TYPE 2 DIABETES MELLITUS WITHOUT COMPLICATION, WITHOUT LONG-TERM CURRENT USE OF INSULIN (MULTI): ICD-10-CM

## 2023-07-28 DIAGNOSIS — E78.2 MIXED HYPERLIPIDEMIA: ICD-10-CM

## 2023-07-28 DIAGNOSIS — E11.9 CONTROLLED TYPE 2 DIABETES MELLITUS WITHOUT COMPLICATION, WITHOUT LONG-TERM CURRENT USE OF INSULIN (MULTI): Primary | ICD-10-CM

## 2023-07-28 RX ORDER — DULAGLUTIDE 0.75 MG/.5ML
0.75 INJECTION, SOLUTION SUBCUTANEOUS
Qty: 2 ML | Refills: 11 | Status: CANCELLED | OUTPATIENT
Start: 2023-07-28

## 2023-07-28 RX ORDER — DULAGLUTIDE 0.75 MG/.5ML
0.75 INJECTION, SOLUTION SUBCUTANEOUS
Qty: 2 ML | Refills: 11 | Status: SHIPPED | OUTPATIENT
Start: 2023-07-28 | End: 2023-10-17 | Stop reason: SDUPTHER

## 2023-07-31 ENCOUNTER — TELEPHONE (OUTPATIENT)
Dept: PRIMARY CARE | Facility: CLINIC | Age: 48
End: 2023-07-31
Payer: COMMERCIAL

## 2023-07-31 NOTE — TELEPHONE ENCOUNTER
Prior authorization request received via fax for [employer]    Form given to: PLACED IN LEAD MA'S BOX ON 7/31/23

## 2023-08-04 ENCOUNTER — TELEPHONE (OUTPATIENT)
Dept: PRIMARY CARE | Facility: CLINIC | Age: 48
End: 2023-08-04
Payer: COMMERCIAL

## 2023-08-04 NOTE — TELEPHONE ENCOUNTER
Prior authorization request received via fax for [employer]     Form given to: PLACED IN LEAD MA'S BOX ON 8/4/23.

## 2023-08-09 ENCOUNTER — PATIENT MESSAGE (OUTPATIENT)
Dept: PRIMARY CARE | Facility: CLINIC | Age: 48
End: 2023-08-09
Payer: COMMERCIAL

## 2023-08-09 DIAGNOSIS — E11.9 CONTROLLED TYPE 2 DIABETES MELLITUS WITHOUT COMPLICATION, WITHOUT LONG-TERM CURRENT USE OF INSULIN (MULTI): ICD-10-CM

## 2023-08-10 RX ORDER — BLOOD-GLUCOSE SENSOR
EACH MISCELLANEOUS
Qty: 3 EACH | Refills: 11 | Status: SHIPPED | OUTPATIENT
Start: 2023-08-10 | End: 2023-08-23 | Stop reason: SDUPTHER

## 2023-08-15 ENCOUNTER — TELEPHONE (OUTPATIENT)
Dept: PRIMARY CARE | Facility: CLINIC | Age: 48
End: 2023-08-15
Payer: COMMERCIAL

## 2023-08-15 NOTE — TELEPHONE ENCOUNTER
Prior authorization request received via fax for [employer]    Form given to: PLACED IN LEAD MA'S BOX ON 8/15/23

## 2023-08-17 DIAGNOSIS — E11.9 CONTROLLED TYPE 2 DIABETES MELLITUS WITHOUT COMPLICATION, WITHOUT LONG-TERM CURRENT USE OF INSULIN (MULTI): ICD-10-CM

## 2023-08-17 RX ORDER — INSULIN GLARGINE 100 [IU]/ML
30 INJECTION, SOLUTION SUBCUTANEOUS NIGHTLY
Qty: 12 EACH | Refills: 3 | Status: SHIPPED | OUTPATIENT
Start: 2023-08-17 | End: 2023-08-18 | Stop reason: SDUPTHER

## 2023-08-18 DIAGNOSIS — E11.9 CONTROLLED TYPE 2 DIABETES MELLITUS WITHOUT COMPLICATION, WITHOUT LONG-TERM CURRENT USE OF INSULIN (MULTI): ICD-10-CM

## 2023-08-18 RX ORDER — INSULIN GLARGINE 100 [IU]/ML
30 INJECTION, SOLUTION SUBCUTANEOUS NIGHTLY
Qty: 15 EACH | Refills: 3 | Status: SHIPPED | OUTPATIENT
Start: 2023-08-18 | End: 2023-10-17 | Stop reason: SDUPTHER

## 2023-08-23 ENCOUNTER — PATIENT MESSAGE (OUTPATIENT)
Dept: PRIMARY CARE | Facility: CLINIC | Age: 48
End: 2023-08-23
Payer: COMMERCIAL

## 2023-08-23 DIAGNOSIS — E11.9 CONTROLLED TYPE 2 DIABETES MELLITUS WITHOUT COMPLICATION, WITHOUT LONG-TERM CURRENT USE OF INSULIN (MULTI): ICD-10-CM

## 2023-08-23 RX ORDER — BLOOD-GLUCOSE SENSOR
EACH MISCELLANEOUS
Qty: 3 EACH | Refills: 3 | Status: SHIPPED | OUTPATIENT
Start: 2023-08-23

## 2023-10-11 ENCOUNTER — LAB (OUTPATIENT)
Dept: LAB | Facility: LAB | Age: 48
End: 2023-10-11
Payer: COMMERCIAL

## 2023-10-11 DIAGNOSIS — R74.01 ELEVATED AST (SGOT): ICD-10-CM

## 2023-10-11 DIAGNOSIS — E78.5 HYPERLIPIDEMIA, UNSPECIFIED HYPERLIPIDEMIA TYPE: ICD-10-CM

## 2023-10-11 DIAGNOSIS — E11.9 CONTROLLED TYPE 2 DIABETES MELLITUS WITHOUT COMPLICATION, WITHOUT LONG-TERM CURRENT USE OF INSULIN (MULTI): ICD-10-CM

## 2023-10-11 DIAGNOSIS — E78.2 MIXED HYPERLIPIDEMIA: ICD-10-CM

## 2023-10-11 LAB
ALBUMIN SERPL BCP-MCNC: 4.8 G/DL (ref 3.4–5)
ALP SERPL-CCNC: 76 U/L (ref 33–120)
ALT SERPL W P-5'-P-CCNC: 152 U/L (ref 10–52)
ANION GAP SERPL CALC-SCNC: 15 MMOL/L (ref 10–20)
AST SERPL W P-5'-P-CCNC: 73 U/L (ref 9–39)
BILIRUB DIRECT SERPL-MCNC: 0.1 MG/DL (ref 0–0.3)
BILIRUB SERPL-MCNC: 0.7 MG/DL (ref 0–1.2)
BUN SERPL-MCNC: 14 MG/DL (ref 6–23)
CALCIUM SERPL-MCNC: 10.1 MG/DL (ref 8.6–10.3)
CHLORIDE SERPL-SCNC: 106 MMOL/L (ref 98–107)
CHOLEST SERPL-MCNC: 126 MG/DL (ref 0–199)
CHOLESTEROL/HDL RATIO: 2.8
CO2 SERPL-SCNC: 24 MMOL/L (ref 21–32)
CREAT SERPL-MCNC: 0.99 MG/DL (ref 0.5–1.3)
EST. AVERAGE GLUCOSE BLD GHB EST-MCNC: 171 MG/DL
GFR SERPL CREATININE-BSD FRML MDRD: >90 ML/MIN/1.73M*2
GLUCOSE SERPL-MCNC: 93 MG/DL (ref 74–99)
HBA1C MFR BLD: 7.6 %
HDLC SERPL-MCNC: 45.5 MG/DL
LDLC SERPL CALC-MCNC: 61 MG/DL (ref 140–190)
NON HDL CHOLESTEROL: 81 MG/DL (ref 0–149)
POTASSIUM SERPL-SCNC: 4.5 MMOL/L (ref 3.5–5.3)
PROT SERPL-MCNC: 7.3 G/DL (ref 6.4–8.2)
SODIUM SERPL-SCNC: 140 MMOL/L (ref 136–145)
TRIGL SERPL-MCNC: 96 MG/DL (ref 0–149)
VLDL: 19 MG/DL (ref 0–40)

## 2023-10-11 PROCEDURE — 36415 COLL VENOUS BLD VENIPUNCTURE: CPT

## 2023-10-11 PROCEDURE — 83036 HEMOGLOBIN GLYCOSYLATED A1C: CPT

## 2023-10-11 PROCEDURE — 80061 LIPID PANEL: CPT

## 2023-10-11 PROCEDURE — 82248 BILIRUBIN DIRECT: CPT

## 2023-10-11 PROCEDURE — 80053 COMPREHEN METABOLIC PANEL: CPT

## 2023-10-11 RX ORDER — ATORVASTATIN CALCIUM 10 MG/1
10 TABLET, FILM COATED ORAL NIGHTLY
Qty: 90 TABLET | Refills: 0 | Status: SHIPPED | OUTPATIENT
Start: 2023-10-11 | End: 2024-01-22 | Stop reason: SDUPTHER

## 2023-10-17 ENCOUNTER — OFFICE VISIT (OUTPATIENT)
Dept: PRIMARY CARE | Facility: CLINIC | Age: 48
End: 2023-10-17
Payer: COMMERCIAL

## 2023-10-17 VITALS
WEIGHT: 222 LBS | BODY MASS INDEX: 31.85 KG/M2 | DIASTOLIC BLOOD PRESSURE: 80 MMHG | HEART RATE: 74 BPM | OXYGEN SATURATION: 99 % | SYSTOLIC BLOOD PRESSURE: 132 MMHG | TEMPERATURE: 96.7 F | RESPIRATION RATE: 16 BRPM

## 2023-10-17 DIAGNOSIS — R74.8 ELEVATED LIVER ENZYMES: Primary | ICD-10-CM

## 2023-10-17 DIAGNOSIS — R74.01 ELEVATED AST (SGOT): ICD-10-CM

## 2023-10-17 DIAGNOSIS — E11.65 UNCONTROLLED TYPE 2 DIABETES MELLITUS WITH HYPERGLYCEMIA (MULTI): ICD-10-CM

## 2023-10-17 DIAGNOSIS — E11.9 CONTROLLED TYPE 2 DIABETES MELLITUS WITHOUT COMPLICATION, WITHOUT LONG-TERM CURRENT USE OF INSULIN (MULTI): ICD-10-CM

## 2023-10-17 PROCEDURE — 4004F PT TOBACCO SCREEN RCVD TLK: CPT

## 2023-10-17 PROCEDURE — 3075F SYST BP GE 130 - 139MM HG: CPT

## 2023-10-17 PROCEDURE — 3079F DIAST BP 80-89 MM HG: CPT

## 2023-10-17 PROCEDURE — 99214 OFFICE O/P EST MOD 30 MIN: CPT

## 2023-10-17 PROCEDURE — 3048F LDL-C <100 MG/DL: CPT

## 2023-10-17 PROCEDURE — 3051F HG A1C>EQUAL 7.0%<8.0%: CPT

## 2023-10-17 RX ORDER — INSULIN GLARGINE 100 [IU]/ML
30 INJECTION, SOLUTION SUBCUTANEOUS NIGHTLY
Qty: 15 EACH | Refills: 3 | Status: SHIPPED | OUTPATIENT
Start: 2023-10-17

## 2023-10-17 RX ORDER — DULAGLUTIDE 0.75 MG/.5ML
0.75 INJECTION, SOLUTION SUBCUTANEOUS
Qty: 2 ML | Refills: 11 | Status: SHIPPED | OUTPATIENT
Start: 2023-10-17 | End: 2024-01-22

## 2023-10-17 RX ORDER — INSULIN GLARGINE-YFGN 100 [IU]/ML
30 INJECTION, SOLUTION SUBCUTANEOUS NIGHTLY
COMMUNITY
Start: 2023-10-10 | End: 2024-01-22 | Stop reason: SDUPTHER

## 2023-10-17 SDOH — ECONOMIC STABILITY: FOOD INSECURITY: WITHIN THE PAST 12 MONTHS, YOU WORRIED THAT YOUR FOOD WOULD RUN OUT BEFORE YOU GOT MONEY TO BUY MORE.: NEVER TRUE

## 2023-10-17 SDOH — ECONOMIC STABILITY: FOOD INSECURITY: WITHIN THE PAST 12 MONTHS, THE FOOD YOU BOUGHT JUST DIDN'T LAST AND YOU DIDN'T HAVE MONEY TO GET MORE.: NEVER TRUE

## 2023-10-17 ASSESSMENT — ENCOUNTER SYMPTOMS
HEMATOLOGIC/LYMPHATIC NEGATIVE: 1
MUSCULOSKELETAL NEGATIVE: 1
EYES NEGATIVE: 1
CARDIOVASCULAR NEGATIVE: 1
ENDOCRINE NEGATIVE: 1
CONSTITUTIONAL NEGATIVE: 1
RESPIRATORY NEGATIVE: 1
NEUROLOGICAL NEGATIVE: 1
PSYCHIATRIC NEGATIVE: 1
GASTROINTESTINAL NEGATIVE: 1

## 2023-10-17 ASSESSMENT — PAIN SCALES - GENERAL: PAINLEVEL: 0-NO PAIN

## 2023-10-17 ASSESSMENT — PATIENT HEALTH QUESTIONNAIRE - PHQ9
1. LITTLE INTEREST OR PLEASURE IN DOING THINGS: NOT AT ALL
2. FEELING DOWN, DEPRESSED OR HOPELESS: NOT AT ALL
SUM OF ALL RESPONSES TO PHQ9 QUESTIONS 1 & 2: 0

## 2023-10-17 ASSESSMENT — LIFESTYLE VARIABLES
SKIP TO QUESTIONS 9-10: 1
HOW MANY STANDARD DRINKS CONTAINING ALCOHOL DO YOU HAVE ON A TYPICAL DAY: 1 OR 2
AUDIT-C TOTAL SCORE: 1
HOW OFTEN DO YOU HAVE A DRINK CONTAINING ALCOHOL: MONTHLY OR LESS
HOW OFTEN DO YOU HAVE SIX OR MORE DRINKS ON ONE OCCASION: NEVER

## 2023-10-17 NOTE — ASSESSMENT & PLAN NOTE
A1c from 10/2023 elevated but stable at 7.6%  Endorses drinking 3 beers per day which may be affecting glycemic control  Has not picked up dulaglutide- may be due to coverage issues?    Encouraged to cut EtOH to no more than 3 beers per week  Continue glipizide 10mg daily, metformin 1000mg BID, lantus 30 units nightly  Start dulaglutide 0.75mg weekly pending insurance coverage

## 2023-10-17 NOTE — ASSESSMENT & PLAN NOTE
AST/ALT remain elevated  Endorses drinking 3 beers per day  Discussion with him today that continued EtOH intake as he's been increases risk of liver failure, worsening glucose control and increased morbidity/mortality    Advised to stop drinking, no more than 3 standard drinks per week  Repeat liver enzymes in 3 months  Low threshold to refer to hepatology  Consider liver US

## 2023-10-17 NOTE — PROGRESS NOTES
Subjective   Patient ID: Herman Fairbanks is a 48 y.o. male who presents for follow up of DM2.    Home glucose readings have been higher recently, forgot to take his medication with him when he went to [place] last week, sugars were in the 350s range.   170-176 has been average glucose reading.  Drinking about 3 beers per day.   Has not since started dulaglutide due to not having it filled, possible PA issue?    Diet: Eating salads, wife cooks pretty healthy. Occasional smoked meats and chicken wings. Occasional candy, avoiding sodas  Exercise: On his feet a lot at work, cuts grass and shovels snow on his property. Walks the dogs  Sleep: Not as good lately, getting about 4 hours then up to use bathroom then sometimes having trouble getting back to sleep. 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    HENT: Negative.     Eyes: Negative.    Respiratory: Negative.     Cardiovascular: Negative.    Gastrointestinal: Negative.    Endocrine: Negative.    Genitourinary: Negative.    Musculoskeletal: Negative.    Skin: Negative.    Neurological: Negative.    Hematological: Negative.    Psychiatric/Behavioral: Negative.          Current Outpatient Medications   Medication Sig Dispense Refill    atorvastatin (Lipitor) 10 mg tablet TAKE 1 TABLET BY MOUTH ONCE DAILY AT BEDTIME 90 tablet 0    blood sugar diagnostic strip For TrueMetrix glucometer. Check blood glucose daily as directed 100 strip 3    blood-glucose sensor (Dexcom G7 Sensor) device Place 1 sensor on arm or abdomen every 10 days 3 each 3    cyclobenzaprine (Flexeril) 10 mg tablet Take 1 tablet (10 mg) by mouth as needed at bedtime for muscle spasms. 90 tablet 1    glipiZIDE XL (Glucotrol XL) 10 mg 24 hr tablet Take 1 tablet (10 mg) by mouth once daily. Do not crush, chew, or split. 30 tablet 11     "insulin glargine-yfgn 100 unit/mL (3 mL) Pen Inject 30 Units under the skin once daily at bedtime.      meloxicam (Mobic) 15 mg tablet Take 1 tablet (15 mg) by mouth once daily. 90 tablet 1    metFORMIN (Glucophage) 1,000 mg tablet Take 1 tablet (1,000 mg) by mouth in the morning and 1 tablet (1,000 mg) in the evening. Take with meals. 180 tablet 3    pen needle, diabetic 32 gauge x 5/32\" needle To be used with lantus injections daily      dulaglutide (Trulicity) 0.75 mg/0.5 mL pen injector Inject 0.75 mg under the skin 1 (one) time per week. 2 mL 11    insulin glargine (Lantus) 100 unit/mL (3 mL) pen Inject 30 Units under the skin once daily at bedtime. Inject 30 units at bedtime. 15 each 3     No current facility-administered medications for this visit.     Past Surgical History:   Procedure Laterality Date    CIRCUMCISION, PRIMARY  Sept77    OTHER SURGICAL HISTORY  01/02/2020    Tonsillectomy    OTHER SURGICAL HISTORY  08/17/2021    Biceps tendon rupture repair     Family History   Problem Relation Name Age of Onset    Hyperlipidemia Mother M     Hypertension Father D     Diabetes Maternal Grandmother G     Heart attack Maternal Grandfather      Sudden death Maternal Grandfather      Diabetes Paternal Grandmother G       Social History     Tobacco Use    Smoking status: Some Days     Types: Cigars    Smokeless tobacco: Never   Vaping Use    Vaping Use: Never used   Substance Use Topics    Alcohol use: Yes     Alcohol/week: 5.0 standard drinks of alcohol     Types: 5 Cans of beer per week    Drug use: Never        Objective     Visit Vitals  /80 (BP Location: Right arm, Patient Position: Sitting, BP Cuff Size: Adult)   Pulse 74   Temp 35.9 °C (96.7 °F) (Temporal)   Resp 16   Wt 101 kg (222 lb)   SpO2 99%   BMI 31.85 kg/m²   Smoking Status Some Days   BSA 2.23 m²        Physical Exam  Constitutional:       Appearance: Normal appearance.   HENT:      Head: Normocephalic and atraumatic.   Eyes:      Extraocular " Movements: Extraocular movements intact.      Pupils: Pupils are equal, round, and reactive to light.   Cardiovascular:      Rate and Rhythm: Normal rate and regular rhythm.   Pulmonary:      Effort: Pulmonary effort is normal.      Breath sounds: Normal breath sounds.   Abdominal:      General: Abdomen is flat. Bowel sounds are normal.      Palpations: Abdomen is soft.   Musculoskeletal:         General: Normal range of motion.   Neurological:      General: No focal deficit present.      Mental Status: He is alert and oriented to person, place, and time.   Psychiatric:         Mood and Affect: Mood normal.         Behavior: Behavior normal.           Assessment/Plan   Problem List Items Addressed This Visit       Uncontrolled type 2 diabetes mellitus with hyperglycemia (CMS/[facility])     A1c from 10/2023 elevated but stable at 7.6%  Endorses drinking 3 beers per day which may be affecting glycemic control  Has not picked up dulaglutide- may be due to coverage issues?    Encouraged to cut EtOH to no more than 3 beers per week  Continue glipizide 10mg daily, metformin 1000mg BID, lantus 30 units nightly  Start dulaglutide 0.75mg weekly pending insurance coverage           Relevant Medications    dulaglutide (Trulicity) 0.75 mg/0.5 mL pen injector    insulin glargine (Lantus) 100 unit/mL (3 mL) pen    Elevated liver enzymes - Primary     AST/ALT remain elevated  Endorses drinking 3 beers per day  Discussion with him today that continued EtOH intake as he's been increases risk of liver failure, worsening glucose control and increased morbidity/mortality    Advised to stop drinking, no more than 3 standard drinks per week  Repeat liver enzymes in 3 months  Low threshold to refer to hepatology  Consider liver US         Relevant Orders    Follow Up In Primary Care - Established     Other Visit Diagnoses       Elevated AST (SGOT)        Relevant Orders    Follow Up In Primary Care - Established            All pertinent lab  work and results were reviewed with patient.     Follow up with me in 3 months    Ana Gee, APRN-CNS

## 2023-10-17 NOTE — PATIENT INSTRUCTIONS
Thank you for coming to see me today.  If you have any questions or concerns following our visit, please contact the office.  Phone: [phone]    Follow up with me in 3 months or sooner as needed    1)  Get non-fasting labwork a few days prior to next appointment.  The lab is down the [address] from our office.     2) Must work on cutting back alcohol- no more than 3 beers/standard drinks per week    3) We will work on getting Trulicity approved by insurance. If approved, inject 0.5mL (.75mg) weekly

## 2023-11-24 ENCOUNTER — TELEPHONE (OUTPATIENT)
Dept: PRIMARY CARE | Facility: CLINIC | Age: 48
End: 2023-11-24
Payer: COMMERCIAL

## 2023-11-24 DIAGNOSIS — H66.90 ACUTE OTITIS MEDIA, UNSPECIFIED OTITIS MEDIA TYPE: Primary | ICD-10-CM

## 2023-11-24 RX ORDER — AMOXICILLIN 875 MG/1
875 TABLET, FILM COATED ORAL 2 TIMES DAILY
Qty: 14 TABLET | Refills: 0 | Status: SHIPPED | OUTPATIENT
Start: 2023-11-24 | End: 2023-12-01

## 2023-11-24 NOTE — TELEPHONE ENCOUNTER
Pt called in and stated he is having ear pain in left ear that is a shooting pain, pt stated he has had this before and it was an ear infection. Please advise.       Pt's pharmacy is [facility]

## 2024-01-18 ENCOUNTER — LAB (OUTPATIENT)
Dept: LAB | Facility: LAB | Age: 49
End: 2024-01-18
Payer: COMMERCIAL

## 2024-01-18 DIAGNOSIS — E11.9 CONTROLLED TYPE 2 DIABETES MELLITUS WITHOUT COMPLICATION, WITHOUT LONG-TERM CURRENT USE OF INSULIN (MULTI): ICD-10-CM

## 2024-01-18 LAB
ALBUMIN SERPL BCP-MCNC: 4.6 G/DL (ref 3.4–5)
ALP SERPL-CCNC: 48 U/L (ref 33–120)
ALT SERPL W P-5'-P-CCNC: 67 U/L (ref 10–52)
ANION GAP SERPL CALC-SCNC: 12 MMOL/L (ref 10–20)
AST SERPL W P-5'-P-CCNC: 32 U/L (ref 9–39)
BILIRUB SERPL-MCNC: 0.5 MG/DL (ref 0–1.2)
BUN SERPL-MCNC: 21 MG/DL (ref 6–23)
CALCIUM SERPL-MCNC: 9.5 MG/DL (ref 8.6–10.3)
CHLORIDE SERPL-SCNC: 102 MMOL/L (ref 98–107)
CO2 SERPL-SCNC: 26 MMOL/L (ref 21–32)
CREAT SERPL-MCNC: 1.1 MG/DL (ref 0.5–1.3)
EGFRCR SERPLBLD CKD-EPI 2021: 84 ML/MIN/1.73M*2
EST. AVERAGE GLUCOSE BLD GHB EST-MCNC: 160 MG/DL
GLUCOSE SERPL-MCNC: 146 MG/DL (ref 74–99)
HBA1C MFR BLD: 7.2 %
POTASSIUM SERPL-SCNC: 3.9 MMOL/L (ref 3.5–5.3)
PROT SERPL-MCNC: 7.2 G/DL (ref 6.4–8.2)
SODIUM SERPL-SCNC: 136 MMOL/L (ref 136–145)

## 2024-01-18 PROCEDURE — 36415 COLL VENOUS BLD VENIPUNCTURE: CPT

## 2024-01-18 PROCEDURE — 83036 HEMOGLOBIN GLYCOSYLATED A1C: CPT

## 2024-01-18 PROCEDURE — 80053 COMPREHEN METABOLIC PANEL: CPT

## 2024-01-22 ENCOUNTER — OFFICE VISIT (OUTPATIENT)
Dept: PRIMARY CARE | Facility: CLINIC | Age: 49
End: 2024-01-22
Payer: COMMERCIAL

## 2024-01-22 VITALS
SYSTOLIC BLOOD PRESSURE: 140 MMHG | DIASTOLIC BLOOD PRESSURE: 92 MMHG | BODY MASS INDEX: 30.61 KG/M2 | RESPIRATION RATE: 20 BRPM | OXYGEN SATURATION: 97 % | TEMPERATURE: 96.1 F | HEIGHT: 70 IN | WEIGHT: 213.8 LBS | HEART RATE: 102 BPM

## 2024-01-22 DIAGNOSIS — R74.8 ELEVATED LIVER ENZYMES: ICD-10-CM

## 2024-01-22 DIAGNOSIS — E11.9 CONTROLLED TYPE 2 DIABETES MELLITUS WITHOUT COMPLICATION, WITHOUT LONG-TERM CURRENT USE OF INSULIN (MULTI): ICD-10-CM

## 2024-01-22 DIAGNOSIS — R74.01 ELEVATED AST (SGOT): ICD-10-CM

## 2024-01-22 DIAGNOSIS — E11.65 UNCONTROLLED TYPE 2 DIABETES MELLITUS WITH HYPERGLYCEMIA (MULTI): Primary | ICD-10-CM

## 2024-01-22 DIAGNOSIS — G89.29 CHRONIC LOW BACK PAIN WITHOUT SCIATICA, UNSPECIFIED BACK PAIN LATERALITY: ICD-10-CM

## 2024-01-22 DIAGNOSIS — E78.5 HYPERLIPIDEMIA, UNSPECIFIED HYPERLIPIDEMIA TYPE: ICD-10-CM

## 2024-01-22 DIAGNOSIS — M54.50 CHRONIC LOW BACK PAIN WITHOUT SCIATICA, UNSPECIFIED BACK PAIN LATERALITY: ICD-10-CM

## 2024-01-22 PROCEDURE — 99214 OFFICE O/P EST MOD 30 MIN: CPT

## 2024-01-22 PROCEDURE — 3080F DIAST BP >= 90 MM HG: CPT

## 2024-01-22 PROCEDURE — 3051F HG A1C>EQUAL 7.0%<8.0%: CPT

## 2024-01-22 PROCEDURE — 3074F SYST BP LT 130 MM HG: CPT

## 2024-01-22 RX ORDER — DULAGLUTIDE 0.75 MG/.5ML
0.75 INJECTION, SOLUTION SUBCUTANEOUS
Qty: 2 ML | Refills: 11 | Status: CANCELLED | OUTPATIENT
Start: 2024-01-22

## 2024-01-22 RX ORDER — GLIPIZIDE 10 MG/1
10 TABLET, FILM COATED, EXTENDED RELEASE ORAL DAILY
Qty: 90 TABLET | Refills: 3 | Status: SHIPPED | OUTPATIENT
Start: 2024-01-22 | End: 2024-04-23 | Stop reason: SDUPTHER

## 2024-01-22 RX ORDER — METFORMIN HYDROCHLORIDE 1000 MG/1
1000 TABLET ORAL
Qty: 180 TABLET | Refills: 3 | Status: SHIPPED | OUTPATIENT
Start: 2024-01-22 | End: 2024-04-23 | Stop reason: SDUPTHER

## 2024-01-22 RX ORDER — GLIPIZIDE 5 MG/1
5 TABLET, FILM COATED, EXTENDED RELEASE ORAL DAILY
Qty: 30 TABLET | Refills: 6 | Status: CANCELLED | OUTPATIENT
Start: 2024-01-22 | End: 2025-01-21

## 2024-01-22 RX ORDER — ATORVASTATIN CALCIUM 10 MG/1
10 TABLET, FILM COATED ORAL NIGHTLY
Qty: 90 TABLET | Refills: 0 | Status: SHIPPED | OUTPATIENT
Start: 2024-01-22 | End: 2024-04-18

## 2024-01-22 RX ORDER — MELOXICAM 15 MG/1
15 TABLET ORAL DAILY
Qty: 90 TABLET | Refills: 1 | Status: SHIPPED | OUTPATIENT
Start: 2024-01-22 | End: 2024-04-23 | Stop reason: SDUPTHER

## 2024-01-22 SDOH — ECONOMIC STABILITY: FOOD INSECURITY: WITHIN THE PAST 12 MONTHS, THE FOOD YOU BOUGHT JUST DIDN'T LAST AND YOU DIDN'T HAVE MONEY TO GET MORE.: NEVER TRUE

## 2024-01-22 SDOH — ECONOMIC STABILITY: FOOD INSECURITY: WITHIN THE PAST 12 MONTHS, YOU WORRIED THAT YOUR FOOD WOULD RUN OUT BEFORE YOU GOT MONEY TO BUY MORE.: NEVER TRUE

## 2024-01-22 ASSESSMENT — ENCOUNTER SYMPTOMS
TREMORS: 0
NERVOUS/ANXIOUS: 0
OCCASIONAL FEELINGS OF UNSTEADINESS: 0
SWEATS: 0
BLACKOUTS: 0
SEIZURES: 0
SPEECH DIFFICULTY: 0
CONFUSION: 0
LOSS OF SENSATION IN FEET: 0
DIZZINESS: 0
DEPRESSION: 0
POLYPHAGIA: 1
WEAKNESS: 0
BLURRED VISION: 0
HUNGER: 1
POLYDIPSIA: 1
FATIGUE: 0
WEIGHT LOSS: 0
HEADACHES: 0
VISUAL CHANGE: 0

## 2024-01-22 ASSESSMENT — PATIENT HEALTH QUESTIONNAIRE - PHQ9
SUM OF ALL RESPONSES TO PHQ9 QUESTIONS 1 & 2: 0
1. LITTLE INTEREST OR PLEASURE IN DOING THINGS: NOT AT ALL
2. FEELING DOWN, DEPRESSED OR HOPELESS: NOT AT ALL

## 2024-01-22 ASSESSMENT — LIFESTYLE VARIABLES
SKIP TO QUESTIONS 9-10: 0
HOW MANY STANDARD DRINKS CONTAINING ALCOHOL DO YOU HAVE ON A TYPICAL DAY: 1 OR 2
HOW OFTEN DO YOU HAVE A DRINK CONTAINING ALCOHOL: MONTHLY OR LESS
HOW OFTEN DO YOU HAVE SIX OR MORE DRINKS ON ONE OCCASION: WEEKLY
AUDIT-C TOTAL SCORE: 4

## 2024-01-22 NOTE — PROGRESS NOTES
Subjective   Patient ID: Herman Fairbanks is a 48 y.o. male who presents for follow up of DM2.    Diabetes  He has type 2 diabetes mellitus. No MedicAlert identification noted. The initial diagnosis of diabetes was made 2 years ago. Hypoglycemia symptoms include hunger and sleepiness. Pertinent negatives for hypoglycemia include no confusion, dizziness, headaches, mood changes, nervousness/anxiousness, pallor, seizures, speech difficulty, sweats or tremors. Associated symptoms include polydipsia and polyphagia. Pertinent negatives for diabetes include no blurred vision, no chest pain, no fatigue, no foot paresthesias, no foot ulcerations, no polyuria, no visual change, no weakness and no weight loss. Pertinent negatives for hypoglycemia complications include no blackouts, no hospitalization, no nocturnal hypoglycemia, no required assistance and no required glucagon injection. Symptoms are improving. Pertinent negatives for diabetic complications include no CVA, heart disease, impotence, nephropathy, peripheral neuropathy, PVD or retinopathy. There are no known risk factors for coronary artery disease. Current diabetic treatment includes diet, insulin injections and oral agent (triple therapy). He is compliant with treatment all of the time. He is currently taking insulin at bedtime. Insulin injections are given by patient. Rotation sites for injection include the abdominal wall. His weight is stable. He is following a generally healthy diet. Meal planning includes avoidance of concentrated sweets. He has not had a previous visit with a dietitian. He participates in exercise three times a week. He monitors blood glucose at home 5+ x per day. He monitors urine at home <1 x per month. Blood glucose monitoring compliance is good. His home blood glucose trend is decreasing steadily. His breakfast blood glucose is taken between 7-8 am. His breakfast blood glucose range is generally 110-130 mg/dl. His lunch blood glucose is  taken between 1-2 pm. His lunch blood glucose range is generally 130-140 mg/dl. His dinner blood glucose is taken between 7-8 pm. His dinner blood glucose range is generally 140-180 mg/dl. His overall blood glucose range is 110-130 mg/dl. He does not see a podiatrist.Eye exam is current.       Diet: Eating salads, wife cooks pretty healthy. Occasional smoked meats and chicken wings. Occasional candy, avoiding sodas  Exercise: On his feet a lot at work, cuts grass and shovels snow on his property. Walks the dogs  Sleep: Not as good lately, getting about 4 hours then up to use bathroom then sometimes having trouble getting back to sleep. 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     Review of Systems   Constitutional:  Negative for fatigue and weight loss.   Eyes:  Negative for blurred vision.   Cardiovascular:  Negative for chest pain.   Endocrine: Positive for polydipsia and polyphagia. Negative for polyuria.   Genitourinary:  Negative for impotence.   Skin:  Negative for pallor.   Neurological:  Negative for dizziness, tremors, seizures, speech difficulty, weakness and headaches.   Psychiatric/Behavioral:  Negative for confusion. The patient is not nervous/anxious.         Current Outpatient Medications   Medication Sig Dispense Refill    blood sugar diagnostic strip For TrueMetrix glucometer. Check blood glucose daily as directed 100 strip 3    blood-glucose sensor (Dexcom G7 Sensor) device Place 1 sensor on arm or abdomen every 10 days 3 each 3    cyclobenzaprine (Flexeril) 10 mg tablet Take 1 tablet (10 mg) by mouth as needed at bedtime for muscle spasms. 90 tablet 1    insulin glargine (Lantus) 100 unit/mL (3 mL) pen Inject 30 Units under the skin once daily at bedtime. Inject 30 units at bedtime. 15 each 3    pen needle, diabetic 32 gauge x  "5/32\" needle To be used with lantus injections daily      atorvastatin (Lipitor) 10 mg tablet Take 1 tablet (10 mg) by mouth once daily at bedtime. 90 tablet 0    empagliflozin (Jardiance) 10 mg Take 1 tablet (10 mg) by mouth once daily. 30 tablet 11    glipiZIDE XL (Glucotrol XL) 10 mg 24 hr tablet Take 1 tablet (10 mg) by mouth once daily. Do not crush, chew, or split. 90 tablet 3    meloxicam (Mobic) 15 mg tablet Take 1 tablet (15 mg) by mouth once daily. 90 tablet 1    metFORMIN (Glucophage) 1,000 mg tablet Take 1 tablet (1,000 mg) by mouth 2 times a day with meals. 180 tablet 3     No current facility-administered medications for this visit.     Past Surgical History:   Procedure Laterality Date    CIRCUMCISION, PRIMARY  Sept77    OTHER SURGICAL HISTORY  01/02/2020    Tonsillectomy    OTHER SURGICAL HISTORY  08/17/2021    Biceps tendon rupture repair     Family History   Problem Relation Name Age of Onset    Hyperlipidemia Mother M     Hypertension Father D     Diabetes Maternal Grandmother G     Heart attack Maternal Grandfather      Sudden death Maternal Grandfather      Diabetes Paternal Grandmother G       Social History     Tobacco Use    Smoking status: Some Days     Types: Cigars     Passive exposure: Current    Smokeless tobacco: Former   Vaping Use    Vaping Use: Never used   Substance Use Topics    Alcohol use: Yes     Alcohol/week: 3.0 standard drinks of alcohol     Types: 3 Cans of beer per week    Drug use: Never        Objective     Visit Vitals  BP (!) 140/92 (BP Location: Right arm, Patient Position: Sitting, BP Cuff Size: Adult long)   Pulse 102   Temp 35.6 °C (96.1 °F)   Resp 20   Ht 1.778 m (5' 10\")   Wt 97 kg (213 lb 12.8 oz)   SpO2 97%   BMI 30.68 kg/m²   Smoking Status Some Days   BSA 2.19 m²        Physical Exam      Assessment/Plan   Problem List Items Addressed This Visit       Chronic low back pain    Relevant Medications    meloxicam (Mobic) 15 mg tablet    Uncontrolled type 2 " diabetes mellitus with hyperglycemia (CMS/[facility]) - Primary     A1c from 1/2024 mildly elevated at 7.2%.   Adriana was not approved by insurance despite that he continues to have uncontrolled DM2   Continue metformin 1000mg twice daily, Lantus 30 units nightly, glipizide 10mg daily; add Jardiance 10mg daily    Recently enrolled in DM2 study/[facility] with clinical research RN Lisa Garduno RN.            Relevant Medications    atorvastatin (Lipitor) 10 mg tablet    metFORMIN (Glucophage) 1,000 mg tablet    empagliflozin (Jardiance) 10 mg    glipiZIDE XL (Glucotrol XL) 10 mg 24 hr tablet    Other Relevant Orders    Hemoglobin A1C    Comprehensive Metabolic Panel    Lipid Panel    Hyperlipidemia, unspecified    Relevant Medications    atorvastatin (Lipitor) 10 mg tablet    Elevated liver enzymes     AST/ALT 32, 67 respectively on labs from 1/2024   Down to 3 drinks per week  Encouraged to continue with this or less level of EtOH intake  Repeat labs in 3 months to ensure continued downtrend of ALT          Other Visit Diagnoses       Controlled type 2 diabetes mellitus without complication, without long-term current use of insulin (CMS/[facility])        Relevant Medications    atorvastatin (Lipitor) 10 mg tablet    metFORMIN (Glucophage) 1,000 mg tablet    empagliflozin (Jardiance) 10 mg    glipiZIDE XL (Glucotrol XL) 10 mg 24 hr tablet    Elevated AST (SGOT)                All pertinent lab work and results were reviewed with patient.     Follow up with me in 3 months    AVNI Weber-CNS

## 2024-01-22 NOTE — PATIENT INSTRUCTIONS
Thank you for coming to see me today.  If you have any questions or concerns following our visit, please contact the office.  Phone: [phone]    Follow up with me in 3 months    1)  START Jardiance 10mg daily. Please let me know if not covered by insurance    2)  Get fasting labwork a few days prior to next visit.  The lab is down the [address] from our office.

## 2024-01-22 NOTE — ASSESSMENT & PLAN NOTE
A1c from 1/2024 mildly elevated at 7.2%.   Adriana was not approved by insurance despite that he continues to have uncontrolled DM2   Continue metformin 1000mg twice daily, Lantus 30 units nightly, glipizide 10mg daily; add Jardiance 10mg daily    Recently enrolled in DM2 study/[facility] with clinical research RN Lisa Garduno RN.

## 2024-01-22 NOTE — ASSESSMENT & PLAN NOTE
AST/ALT 32, 67 respectively on labs from 1/2024   Down to 3 drinks per week  Encouraged to continue with this or less level of EtOH intake  Repeat labs in 3 months to ensure continued downtrend of ALT

## 2024-04-18 DIAGNOSIS — E11.9 CONTROLLED TYPE 2 DIABETES MELLITUS WITHOUT COMPLICATION, WITHOUT LONG-TERM CURRENT USE OF INSULIN (MULTI): ICD-10-CM

## 2024-04-18 DIAGNOSIS — E78.5 HYPERLIPIDEMIA, UNSPECIFIED HYPERLIPIDEMIA TYPE: ICD-10-CM

## 2024-04-18 RX ORDER — ATORVASTATIN CALCIUM 10 MG/1
10 TABLET, FILM COATED ORAL NIGHTLY
Qty: 90 TABLET | Refills: 3 | Status: SHIPPED | OUTPATIENT
Start: 2024-04-18

## 2024-04-19 ENCOUNTER — LAB (OUTPATIENT)
Dept: LAB | Facility: LAB | Age: 49
End: 2024-04-19
Payer: COMMERCIAL

## 2024-04-19 DIAGNOSIS — E11.65 UNCONTROLLED TYPE 2 DIABETES MELLITUS WITH HYPERGLYCEMIA (MULTI): ICD-10-CM

## 2024-04-19 LAB
ALBUMIN SERPL BCP-MCNC: 4.8 G/DL (ref 3.4–5)
ALP SERPL-CCNC: 47 U/L (ref 33–120)
ALT SERPL W P-5'-P-CCNC: 72 U/L (ref 10–52)
ANION GAP SERPL CALC-SCNC: 16 MMOL/L (ref 10–20)
AST SERPL W P-5'-P-CCNC: 41 U/L (ref 9–39)
BILIRUB SERPL-MCNC: 0.8 MG/DL (ref 0–1.2)
BUN SERPL-MCNC: 19 MG/DL (ref 6–23)
CALCIUM SERPL-MCNC: 10 MG/DL (ref 8.6–10.3)
CHLORIDE SERPL-SCNC: 101 MMOL/L (ref 98–107)
CHOLEST SERPL-MCNC: 121 MG/DL (ref 0–199)
CHOLESTEROL/HDL RATIO: 2.6
CO2 SERPL-SCNC: 25 MMOL/L (ref 21–32)
CREAT SERPL-MCNC: 1.06 MG/DL (ref 0.5–1.3)
EGFRCR SERPLBLD CKD-EPI 2021: 88 ML/MIN/1.73M*2
EST. AVERAGE GLUCOSE BLD GHB EST-MCNC: 154 MG/DL
GLUCOSE SERPL-MCNC: 105 MG/DL (ref 74–99)
HBA1C MFR BLD: 7 %
HDLC SERPL-MCNC: 45.8 MG/DL
LDLC SERPL CALC-MCNC: 54 MG/DL
NON HDL CHOLESTEROL: 75 MG/DL (ref 0–149)
POTASSIUM SERPL-SCNC: 3.7 MMOL/L (ref 3.5–5.3)
PROT SERPL-MCNC: 7.5 G/DL (ref 6.4–8.2)
SODIUM SERPL-SCNC: 138 MMOL/L (ref 136–145)
TRIGL SERPL-MCNC: 104 MG/DL (ref 0–149)
VLDL: 21 MG/DL (ref 0–40)

## 2024-04-19 PROCEDURE — 80053 COMPREHEN METABOLIC PANEL: CPT

## 2024-04-19 PROCEDURE — 36415 COLL VENOUS BLD VENIPUNCTURE: CPT

## 2024-04-19 PROCEDURE — 80061 LIPID PANEL: CPT

## 2024-04-19 PROCEDURE — 83036 HEMOGLOBIN GLYCOSYLATED A1C: CPT

## 2024-04-23 ENCOUNTER — OFFICE VISIT (OUTPATIENT)
Dept: PRIMARY CARE | Facility: CLINIC | Age: 49
End: 2024-04-23
Payer: COMMERCIAL

## 2024-04-23 ENCOUNTER — TELEPHONE (OUTPATIENT)
Dept: PRIMARY CARE | Facility: CLINIC | Age: 49
End: 2024-04-23

## 2024-04-23 VITALS
BODY MASS INDEX: 29.92 KG/M2 | SYSTOLIC BLOOD PRESSURE: 127 MMHG | DIASTOLIC BLOOD PRESSURE: 80 MMHG | WEIGHT: 209 LBS | TEMPERATURE: 96.9 F | RESPIRATION RATE: 20 BRPM | HEART RATE: 103 BPM | HEIGHT: 70 IN | OXYGEN SATURATION: 98 %

## 2024-04-23 DIAGNOSIS — G89.29 CHRONIC LOW BACK PAIN WITHOUT SCIATICA, UNSPECIFIED BACK PAIN LATERALITY: ICD-10-CM

## 2024-04-23 DIAGNOSIS — Z13.29 SCREENING FOR THYROID DISORDER: ICD-10-CM

## 2024-04-23 DIAGNOSIS — Z12.11 COLON CANCER SCREENING: ICD-10-CM

## 2024-04-23 DIAGNOSIS — E11.65 UNCONTROLLED TYPE 2 DIABETES MELLITUS WITH HYPERGLYCEMIA (MULTI): ICD-10-CM

## 2024-04-23 DIAGNOSIS — M54.50 CHRONIC LOW BACK PAIN WITHOUT SCIATICA, UNSPECIFIED BACK PAIN LATERALITY: ICD-10-CM

## 2024-04-23 DIAGNOSIS — E11.9 CONTROLLED TYPE 2 DIABETES MELLITUS WITHOUT COMPLICATION, WITHOUT LONG-TERM CURRENT USE OF INSULIN (MULTI): ICD-10-CM

## 2024-04-23 DIAGNOSIS — E78.5 HYPERLIPIDEMIA, UNSPECIFIED HYPERLIPIDEMIA TYPE: ICD-10-CM

## 2024-04-23 DIAGNOSIS — R74.8 ELEVATED LIVER ENZYMES: Primary | ICD-10-CM

## 2024-04-23 PROBLEM — R73.01 IMPAIRED FASTING GLUCOSE: Status: ACTIVE | Noted: 2024-04-23

## 2024-04-23 PROBLEM — M25.622 STIFFNESS OF LEFT ELBOW JOINT: Status: ACTIVE | Noted: 2024-04-23

## 2024-04-23 PROBLEM — T23.011A: Status: ACTIVE | Noted: 2024-04-23

## 2024-04-23 PROBLEM — M75.40 IMPINGEMENT SYNDROME OF SHOULDER REGION: Status: ACTIVE | Noted: 2024-04-23

## 2024-04-23 PROBLEM — M75.50 BURSITIS OF SHOULDER: Status: ACTIVE | Noted: 2024-04-23

## 2024-04-23 PROBLEM — M25.529 ELBOW PAIN: Status: ACTIVE | Noted: 2024-04-23

## 2024-04-23 PROCEDURE — 3051F HG A1C>EQUAL 7.0%<8.0%: CPT

## 2024-04-23 PROCEDURE — 3074F SYST BP LT 130 MM HG: CPT

## 2024-04-23 PROCEDURE — 3079F DIAST BP 80-89 MM HG: CPT

## 2024-04-23 PROCEDURE — 3048F LDL-C <100 MG/DL: CPT

## 2024-04-23 PROCEDURE — 99214 OFFICE O/P EST MOD 30 MIN: CPT

## 2024-04-23 RX ORDER — CYCLOBENZAPRINE HCL 10 MG
10 TABLET ORAL NIGHTLY PRN
Qty: 90 TABLET | Refills: 1 | Status: SHIPPED | OUTPATIENT
Start: 2024-04-23

## 2024-04-23 RX ORDER — GLIPIZIDE 10 MG/1
10 TABLET, FILM COATED, EXTENDED RELEASE ORAL DAILY
Qty: 90 TABLET | Refills: 3 | Status: SHIPPED | OUTPATIENT
Start: 2024-04-23 | End: 2025-04-23

## 2024-04-23 RX ORDER — MELOXICAM 15 MG/1
15 TABLET ORAL DAILY
Qty: 90 TABLET | Refills: 3 | Status: SHIPPED | OUTPATIENT
Start: 2024-04-23

## 2024-04-23 RX ORDER — DULAGLUTIDE 0.75 MG/.5ML
0.75 INJECTION, SOLUTION SUBCUTANEOUS
Qty: 2 ML | Refills: 11 | Status: SHIPPED | OUTPATIENT
Start: 2024-04-28

## 2024-04-23 RX ORDER — METFORMIN HYDROCHLORIDE 1000 MG/1
1000 TABLET ORAL
Qty: 180 TABLET | Refills: 3 | Status: SHIPPED | OUTPATIENT
Start: 2024-04-23 | End: 2025-04-23

## 2024-04-23 SDOH — ECONOMIC STABILITY: FOOD INSECURITY: WITHIN THE PAST 12 MONTHS, THE FOOD YOU BOUGHT JUST DIDN'T LAST AND YOU DIDN'T HAVE MONEY TO GET MORE.: NEVER TRUE

## 2024-04-23 SDOH — ECONOMIC STABILITY: FOOD INSECURITY: WITHIN THE PAST 12 MONTHS, YOU WORRIED THAT YOUR FOOD WOULD RUN OUT BEFORE YOU GOT MONEY TO BUY MORE.: NEVER TRUE

## 2024-04-23 ASSESSMENT — PAIN SCALES - GENERAL: PAINLEVEL: 0-NO PAIN

## 2024-04-23 ASSESSMENT — LIFESTYLE VARIABLES
HOW MANY STANDARD DRINKS CONTAINING ALCOHOL DO YOU HAVE ON A TYPICAL DAY: 1 OR 2
HOW OFTEN DO YOU HAVE A DRINK CONTAINING ALCOHOL: 2-3 TIMES A WEEK
AUDIT-C TOTAL SCORE: 3
SKIP TO QUESTIONS 9-10: 1
HOW OFTEN DO YOU HAVE SIX OR MORE DRINKS ON ONE OCCASION: NEVER

## 2024-04-23 ASSESSMENT — ENCOUNTER SYMPTOMS
MUSCULOSKELETAL NEGATIVE: 1
GASTROINTESTINAL NEGATIVE: 1
ENDOCRINE NEGATIVE: 1
CARDIOVASCULAR NEGATIVE: 1
LOSS OF SENSATION IN FEET: 0
RESPIRATORY NEGATIVE: 1
CONSTITUTIONAL NEGATIVE: 1
DEPRESSION: 0
PSYCHIATRIC NEGATIVE: 1
HEMATOLOGIC/LYMPHATIC NEGATIVE: 1
EYES NEGATIVE: 1
NEUROLOGICAL NEGATIVE: 1
OCCASIONAL FEELINGS OF UNSTEADINESS: 0

## 2024-04-23 NOTE — ASSESSMENT & PLAN NOTE
Reports cutting back on beer, drinking 6 beers per week ([employer])   Still with elevated liver enzymes from labs 4/2024 (AST 41 ALT 72) despite self report of decreasing EtOH intake    Encouraged to further decrease alcohol intake  Obtain liver US to evaluate for cirrhosis

## 2024-04-23 NOTE — PATIENT INSTRUCTIONS
"Thank you for coming to see me today.  If you have any questions or concerns following our visit, please contact the office.  Phone: [phone]    Follow up with me in 3 months or sooner as needed    1)  DECREASE Lantus to 20 units \"nightly\"    2) INCREASE Jardiance to 25mg daily    3) START dulaglutide (Trulicity) 0.75mg weekly    4) I am referring you to clinical pharmacy. You can expect a call from our clinical pharmacist Grace in the next few days     5) Cologuard screening will be mailed to your home, please complete within 1 week of receiving     6) Please schedule a liver US to evaluate liver enzyme elevation - please call [phone] or schedule at  on your way out today   "

## 2024-04-23 NOTE — ASSESSMENT & PLAN NOTE
A1c from 4/2024 7.0% improved from the past    Decrease Lantus to 20 units nightly  Increase empagliflozin to 25mg daily   Start dulaglutide 0.75mg weekly  Continue metformin 1000mg twice daily and glipizide XL 10mg daily  Refer to clinical pharmacy for dosing adjustment to wean off of Trulicity

## 2024-04-23 NOTE — PROGRESS NOTES
"Subjective   Patient ID: Herman Fairbanks is a 48 y.o. male who presents for follow up of DM2.    Reports he has slowed down on drinking since last visit. Liver enzymes elevated despite reportedly not drinking more than 6 beers per week.     Diet: Eating salads, wife cooks pretty healthy. Occasional smoked meats and chicken wings. Occasional candy, avoiding sodas  Exercise: On his feet a lot at work, cuts grass and shovels snow on his property. Walks the dogs  Sleep: Not as good lately, getting about 4 hours then up to use bathroom then sometimes having trouble getting back to sleep. 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     Review of Systems   Constitutional: Negative.    HENT: Negative.     Eyes: Negative.    Respiratory: Negative.     Cardiovascular: Negative.    Gastrointestinal: Negative.    Endocrine: Negative.    Genitourinary: Negative.    Musculoskeletal: Negative.    Skin: Negative.    Neurological: Negative.    Hematological: Negative.    Psychiatric/Behavioral: Negative.          Current Outpatient Medications   Medication Sig Dispense Refill    atorvastatin (Lipitor) 10 mg tablet TAKE 1 TABLET BY MOUTH ONCE DAILY AT BEDTIME 90 tablet 3    blood sugar diagnostic strip For TrueMetrix glucometer. Check blood glucose daily as directed 100 strip 3    blood-glucose sensor (Dexcom G7 Sensor) device Place 1 sensor on arm or abdomen every 10 days 3 each 3    insulin glargine (Lantus) 100 unit/mL (3 mL) pen Inject 30 Units under the skin once daily at bedtime. Inject 30 units at bedtime. 15 each 3    pen needle, diabetic 32 gauge x 5/32\" needle To be used with lantus injections daily      cyclobenzaprine (Flexeril) 10 mg tablet Take 1 tablet (10 mg) by mouth as needed at bedtime for muscle spasms. 90 tablet 1    [START ON 4/28/2024] dulaglutide (Trulicity) 0.75 " "mg/0.5 mL pen injector Inject 0.75 mg under the skin 1 (one) time per week. 2 mL 11    empagliflozin (Jardiance) 25 mg Take 1 tablet (25 mg) by mouth once daily. 30 tablet 11    glipiZIDE XL (Glucotrol XL) 10 mg 24 hr tablet Take 1 tablet (10 mg) by mouth once daily. Do not crush, chew, or split. 90 tablet 3    meloxicam (Mobic) 15 mg tablet Take 1 tablet (15 mg) by mouth once daily. 90 tablet 3    metFORMIN (Glucophage) 1,000 mg tablet Take 1 tablet (1,000 mg) by mouth 2 times a day with meals. 180 tablet 3     No current facility-administered medications for this visit.     Past Surgical History:   Procedure Laterality Date    CIRCUMCISION, PRIMARY  Sept77    OTHER SURGICAL HISTORY  01/02/2020    Tonsillectomy    OTHER SURGICAL HISTORY  08/17/2021    Biceps tendon rupture repair     Family History   Problem Relation Name Age of Onset    Hyperlipidemia Mother M     Hypertension Father D     Diabetes Maternal Grandmother G     Heart attack Maternal Grandfather      Sudden death Maternal Grandfather      Diabetes Paternal Grandmother G       Social History     Tobacco Use    Smoking status: Some Days     Types: Cigars     Passive exposure: Current    Smokeless tobacco: Former   Vaping Use    Vaping status: Never Used   Substance Use Topics    Alcohol use: Yes     Alcohol/week: 3.0 standard drinks of alcohol     Types: 3 Cans of beer per week    Drug use: Never        Objective     Visit Vitals  /80 (BP Location: Left arm, Patient Position: Sitting, BP Cuff Size: Adult)   Pulse 103   Temp 36.1 °C (96.9 °F)   Resp 20   Ht 1.778 m (5' 10\")   Wt 94.8 kg (209 lb)   SpO2 98%   BMI 29.99 kg/m²   Smoking Status Some Days   BSA 2.16 m²        Physical Exam  Constitutional:       Appearance: Normal appearance.   HENT:      Head: Normocephalic and atraumatic.   Eyes:      Extraocular Movements: Extraocular movements intact.      Pupils: Pupils are equal, round, and reactive to light.   Musculoskeletal:         General: " Normal range of motion.   Skin:     General: Skin is warm and dry.      Capillary Refill: Capillary refill takes less than 2 seconds.   Neurological:      General: No focal deficit present.      Mental Status: He is alert and oriented to person, place, and time.   Psychiatric:         Mood and Affect: Mood normal.         Behavior: Behavior normal.           Assessment/Plan   Problem List Items Addressed This Visit       Chronic low back pain    Relevant Medications    cyclobenzaprine (Flexeril) 10 mg tablet    meloxicam (Mobic) 15 mg tablet    Uncontrolled type 2 diabetes mellitus with hyperglycemia (Multi)     A1c from 4/2024 7.0% improved from the past    Decrease Lantus to 20 units nightly  Increase empagliflozin to 25mg daily   Start dulaglutide 0.75mg weekly  Continue metformin 1000mg twice daily and glipizide XL 10mg daily  Refer to clinical pharmacy for dosing adjustment to wean off of Trulicity             Relevant Medications    empagliflozin (Jardiance) 25 mg    glipiZIDE XL (Glucotrol XL) 10 mg 24 hr tablet    metFORMIN (Glucophage) 1,000 mg tablet    dulaglutide (Trulicity) 0.75 mg/0.5 mL pen injector (Start on 4/28/2024)    Hyperlipidemia, unspecified    Relevant Orders    Lipid Panel    Elevated liver enzymes - Primary     Reports cutting back on beer, drinking 6 beers per week ([employer])   Still with elevated liver enzymes from labs 4/2024 (AST 41 ALT 72) despite self report of decreasing EtOH intake    Encouraged to further decrease alcohol intake  Obtain liver US to evaluate for cirrhosis         Relevant Orders    US abdomen limited liver     Other Visit Diagnoses       Controlled type 2 diabetes mellitus without complication, without long-term current use of insulin (Multi)        Relevant Medications    empagliflozin (Jardiance) 25 mg    glipiZIDE XL (Glucotrol XL) 10 mg 24 hr tablet    metFORMIN (Glucophage) 1,000 mg tablet    dulaglutide (Trulicity) 0.75 mg/0.5 mL pen injector (Start on  4/28/2024)    Other Relevant Orders    CBC    Comprehensive Metabolic Panel    Hemoglobin A1C    Albumin , Urine Random    Referral to Clinical Pharmacy    Colon cancer screening        Relevant Orders    Cologuard® colon cancer screening    Screening for thyroid disorder        Relevant Orders    TSH with reflex to Free T4 if abnormal    Vitamin D 25-Hydroxy,Total (for eval of Vitamin D levels)            All pertinent lab work and results were reviewed with patient.     Follow up with me in 3 months    Ana Gee, APRN-CNS

## 2024-05-06 ENCOUNTER — HOSPITAL ENCOUNTER (OUTPATIENT)
Dept: RADIOLOGY | Facility: CLINIC | Age: 49
Discharge: HOME | End: 2024-05-06
Payer: COMMERCIAL

## 2024-05-06 DIAGNOSIS — R74.8 ELEVATED LIVER ENZYMES: ICD-10-CM

## 2024-05-06 PROCEDURE — 76705 ECHO EXAM OF ABDOMEN: CPT

## 2024-05-06 PROCEDURE — 76705 ECHO EXAM OF ABDOMEN: CPT | Performed by: RADIOLOGY

## 2024-05-10 DIAGNOSIS — E11.65 UNCONTROLLED TYPE 2 DIABETES MELLITUS WITH HYPERGLYCEMIA (MULTI): Primary | ICD-10-CM

## 2024-05-10 RX ORDER — INSULIN GLARGINE-YFGN 100 [IU]/ML
30 INJECTION, SOLUTION SUBCUTANEOUS NIGHTLY
Qty: 15 ML | Refills: 0 | Status: SHIPPED | OUTPATIENT
Start: 2024-05-10

## 2024-05-21 ENCOUNTER — TELEMEDICINE (OUTPATIENT)
Dept: PHARMACY | Facility: HOSPITAL | Age: 49
End: 2024-05-21
Payer: COMMERCIAL

## 2024-05-21 DIAGNOSIS — E11.9 CONTROLLED TYPE 2 DIABETES MELLITUS WITHOUT COMPLICATION, WITHOUT LONG-TERM CURRENT USE OF INSULIN (MULTI): ICD-10-CM

## 2024-05-21 NOTE — PROGRESS NOTES
Pharmacist Clinic: Diabetes Management  Herman Fairbanks is a 48 y.o. male was referred to Clinical Pharmacy Team for diabetes management.   Referring Provider: Ana Gee AP*  - Last visit with referring provider: 4/23/24    Subjective     HPI    - Prescribed Trulicity but requires PA; has not started yet    Current diet:   - Wife cooks overall healthy  - Eating salads, occasional smoked meats, chicken wings  - Sometimes candy, avoiding sodas  - Drinks 4-5 water bottles/day    Current exercise:   - On his feet at work a lot  - Walks dogs    Current monitoring regimen:   Patient is using: continuous glucose monitor; dexcom g7    Reported blood sugars:   FBG -   Usually stays <180  14 day avg = 156    Any episodes of hypoglycemia? yes - 1 instance last night went to 44, not sure what     Adverse Effects:   None     Objective     There were no vitals taken for this visit.    No Known Allergies    Current Diabetes Pharmacotherapy:    Lantus 20 units nightly  Jardiance 25 mg daily  Metformin 1000 mg twice daily  Glipizide XL 10 mg daily    Historical Diabetes Pharmacotherapy:  N/A    SECONDARY PREVENTION  - Statin? Yes   - ACE-I/ARB? No  - Aspirin? No    Pertinent PMH Review:  - PMH of Pancreatitis: No  - PMH of Retinopathy: No  - PMH of Urinary Tract Infections: No  - PMH of MTC: No    Lab Review  Lab Results   Component Value Date    BILITOT 0.8 04/19/2024    CALCIUM 10.0 04/19/2024    CO2 25 04/19/2024     04/19/2024    CREATININE 1.06 04/19/2024    GLUCOSE 105 (H) 04/19/2024    ALKPHOS 47 04/19/2024    K 3.7 04/19/2024    PROT 7.5 04/19/2024     04/19/2024    AST 41 (H) 04/19/2024    ALT 72 (H) 04/19/2024    BUN 19 04/19/2024    ANIONGAP 16 04/19/2024    MG 1.82 08/11/2021    ALBUMIN 4.8 04/19/2024    GFRMALE 85 07/17/2023     Lab Results   Component Value Date    TRIG 104 04/19/2024    CHOL 121 04/19/2024    HDL 45.8 04/19/2024     Lab Results   Component Value Date    HGBA1C 7.0 (H)  04/19/2024    HGBA1C 7.2 (H) 01/18/2024    HGBA1C 7.6 (H) 10/11/2023     The ASCVD Risk score (Allyson LUCAS, et al., 2019) failed to calculate for the following reasons:    The valid total cholesterol range is 130 to 320 mg/dL    Drug Interactions:  No issues    Affordability/Accessibility:  Trulicity     Assessment/Plan   Problem List Items Addressed This Visit    None  Visit Diagnoses       Controlled type 2 diabetes mellitus without complication, without long-term current use of insulin (Multi)              ASSESSMENT:  Patients diabetes is stable with most recent A1c of 7%. (Goals: A1c <7; FBG ; 2HPP <180).   Rationale for plan: Patient and PCP's goal is to get off insulin. Wants to start Trulicity, but requires PA.     PLAN:  CONTINUE all current pharmacotherapy  Trulicity PA submitted today  Test blood sugars: continuously with Dexcom G7. Instructed patient to download Dexcom Clarity mayra so we can connect via clinic account at next visit.  Education provided:   Trulicity Education:  - Counseled patient on Trulicity MOA, expectations, side effects, duration of therapy, administration, and monitoring parameters.  - Provided detailed dosing and administration counseling to ensure proper technique.   - Reviewed Trulicity titration schedule, starting with 0.75 mg once weekly to 1.5 mg, 3 mg, and if tolerated 4.5 mg.  - Counseled patient on the benefits of GLP-1ra, such as cardiovascular risk reduction, glycemic control, and weight loss potential.  - Reviewed storage requirements of Trulicity when not in use, and when to administer the medication if a dose is missed.  - Advised patient that they may experience improved satiety after meals and portion sizes of meals may be reduced as doses of Trulicity increase.   Follow up with clinical pharmacist: 5/28 10 am  Follow up with PCP: 7/29/24    Thank you,  Grace Watts, PharmD  Clinical Pharmacy Specialist  [phone]  [email or web address]     Continue  all meds under the continuation of care with the referring provider and clinical pharmacy team.

## 2024-05-28 DIAGNOSIS — E11.9 CONTROLLED TYPE 2 DIABETES MELLITUS WITHOUT COMPLICATION, WITHOUT LONG-TERM CURRENT USE OF INSULIN (MULTI): Primary | ICD-10-CM

## 2024-06-11 ENCOUNTER — APPOINTMENT (OUTPATIENT)
Dept: PHARMACY | Facility: HOSPITAL | Age: 49
End: 2024-06-11
Payer: COMMERCIAL

## 2024-06-19 DIAGNOSIS — E11.9 CONTROLLED TYPE 2 DIABETES MELLITUS WITHOUT COMPLICATION, WITHOUT LONG-TERM CURRENT USE OF INSULIN (MULTI): ICD-10-CM

## 2024-06-19 RX ORDER — BLOOD-GLUCOSE SENSOR
EACH MISCELLANEOUS
Qty: 3 EACH | Refills: 3 | Status: SHIPPED | OUTPATIENT
Start: 2024-06-19

## 2024-07-23 LAB — NONINV COLON CA DNA+OCC BLD SCRN STL QL: NORMAL

## 2024-07-24 ENCOUNTER — LAB (OUTPATIENT)
Dept: LAB | Facility: LAB | Age: 49
End: 2024-07-24
Payer: COMMERCIAL

## 2024-07-24 DIAGNOSIS — E78.5 HYPERLIPIDEMIA, UNSPECIFIED HYPERLIPIDEMIA TYPE: ICD-10-CM

## 2024-07-24 DIAGNOSIS — E11.9 CONTROLLED TYPE 2 DIABETES MELLITUS WITHOUT COMPLICATION, WITHOUT LONG-TERM CURRENT USE OF INSULIN (MULTI): ICD-10-CM

## 2024-07-24 DIAGNOSIS — Z13.29 SCREENING FOR THYROID DISORDER: ICD-10-CM

## 2024-07-24 LAB
25(OH)D3 SERPL-MCNC: 31 NG/ML (ref 30–100)
ALBUMIN SERPL BCP-MCNC: 4.4 G/DL (ref 3.4–5)
ALP SERPL-CCNC: 47 U/L (ref 33–120)
ALT SERPL W P-5'-P-CCNC: 48 U/L (ref 10–52)
ANION GAP SERPL CALC-SCNC: 12 MMOL/L (ref 10–20)
AST SERPL W P-5'-P-CCNC: 22 U/L (ref 9–39)
BILIRUB SERPL-MCNC: 0.5 MG/DL (ref 0–1.2)
BUN SERPL-MCNC: 17 MG/DL (ref 6–23)
CALCIUM SERPL-MCNC: 9.6 MG/DL (ref 8.6–10.3)
CHLORIDE SERPL-SCNC: 106 MMOL/L (ref 98–107)
CHOLEST SERPL-MCNC: 102 MG/DL (ref 0–199)
CHOLESTEROL/HDL RATIO: 2.5
CO2 SERPL-SCNC: 24 MMOL/L (ref 21–32)
CREAT SERPL-MCNC: 1.07 MG/DL (ref 0.5–1.3)
CREAT UR-MCNC: 140.2 MG/DL (ref 20–370)
EGFRCR SERPLBLD CKD-EPI 2021: 87 ML/MIN/1.73M*2
ERYTHROCYTE [DISTWIDTH] IN BLOOD BY AUTOMATED COUNT: 12.7 % (ref 11.5–14.5)
EST. AVERAGE GLUCOSE BLD GHB EST-MCNC: 143 MG/DL
GLUCOSE SERPL-MCNC: 86 MG/DL (ref 74–99)
HBA1C MFR BLD: 6.6 %
HCT VFR BLD AUTO: 52.3 % (ref 41–52)
HDLC SERPL-MCNC: 40.3 MG/DL
HGB BLD-MCNC: 17.7 G/DL (ref 13.5–17.5)
LDLC SERPL CALC-MCNC: 33 MG/DL
MCH RBC QN AUTO: 30.6 PG (ref 26–34)
MCHC RBC AUTO-ENTMCNC: 33.8 G/DL (ref 32–36)
MCV RBC AUTO: 91 FL (ref 80–100)
MICROALBUMIN UR-MCNC: <7 MG/L
MICROALBUMIN/CREAT UR: NORMAL MG/G{CREAT}
NON HDL CHOLESTEROL: 62 MG/DL (ref 0–149)
NRBC BLD-RTO: 0 /100 WBCS (ref 0–0)
PLATELET # BLD AUTO: 179 X10*3/UL (ref 150–450)
POTASSIUM SERPL-SCNC: 4.3 MMOL/L (ref 3.5–5.3)
PROT SERPL-MCNC: 6.6 G/DL (ref 6.4–8.2)
RBC # BLD AUTO: 5.78 X10*6/UL (ref 4.5–5.9)
SODIUM SERPL-SCNC: 138 MMOL/L (ref 136–145)
TRIGL SERPL-MCNC: 145 MG/DL (ref 0–149)
TSH SERPL-ACNC: 2.62 MIU/L (ref 0.44–3.98)
VLDL: 29 MG/DL (ref 0–40)
WBC # BLD AUTO: 6.7 X10*3/UL (ref 4.4–11.3)

## 2024-07-24 PROCEDURE — 83036 HEMOGLOBIN GLYCOSYLATED A1C: CPT

## 2024-07-24 PROCEDURE — 84443 ASSAY THYROID STIM HORMONE: CPT

## 2024-07-24 PROCEDURE — 85027 COMPLETE CBC AUTOMATED: CPT

## 2024-07-24 PROCEDURE — 82043 UR ALBUMIN QUANTITATIVE: CPT

## 2024-07-24 PROCEDURE — 36415 COLL VENOUS BLD VENIPUNCTURE: CPT

## 2024-07-24 PROCEDURE — 80061 LIPID PANEL: CPT

## 2024-07-24 PROCEDURE — 80053 COMPREHEN METABOLIC PANEL: CPT

## 2024-07-24 PROCEDURE — 82306 VITAMIN D 25 HYDROXY: CPT

## 2024-07-24 PROCEDURE — 82570 ASSAY OF URINE CREATININE: CPT

## 2024-07-29 ENCOUNTER — APPOINTMENT (OUTPATIENT)
Dept: PRIMARY CARE | Facility: CLINIC | Age: 49
End: 2024-07-29
Payer: COMMERCIAL

## 2024-07-29 VITALS
SYSTOLIC BLOOD PRESSURE: 114 MMHG | WEIGHT: 207 LBS | BODY MASS INDEX: 29.7 KG/M2 | OXYGEN SATURATION: 97 % | HEART RATE: 89 BPM | DIASTOLIC BLOOD PRESSURE: 80 MMHG | TEMPERATURE: 97.5 F | RESPIRATION RATE: 12 BRPM

## 2024-07-29 DIAGNOSIS — E78.5 HYPERLIPIDEMIA, UNSPECIFIED HYPERLIPIDEMIA TYPE: ICD-10-CM

## 2024-07-29 DIAGNOSIS — E11.65 UNCONTROLLED TYPE 2 DIABETES MELLITUS WITH HYPERGLYCEMIA (MULTI): ICD-10-CM

## 2024-07-29 DIAGNOSIS — G89.29 CHRONIC LOW BACK PAIN WITHOUT SCIATICA, UNSPECIFIED BACK PAIN LATERALITY: ICD-10-CM

## 2024-07-29 DIAGNOSIS — Z79.4 CONTROLLED TYPE 2 DIABETES MELLITUS WITHOUT COMPLICATION, WITH LONG-TERM CURRENT USE OF INSULIN (MULTI): Primary | ICD-10-CM

## 2024-07-29 DIAGNOSIS — E11.9 CONTROLLED TYPE 2 DIABETES MELLITUS WITHOUT COMPLICATION, WITH LONG-TERM CURRENT USE OF INSULIN (MULTI): Primary | ICD-10-CM

## 2024-07-29 DIAGNOSIS — M54.50 CHRONIC LOW BACK PAIN WITHOUT SCIATICA, UNSPECIFIED BACK PAIN LATERALITY: ICD-10-CM

## 2024-07-29 DIAGNOSIS — E11.9 CONTROLLED TYPE 2 DIABETES MELLITUS WITHOUT COMPLICATION, WITHOUT LONG-TERM CURRENT USE OF INSULIN (MULTI): ICD-10-CM

## 2024-07-29 DIAGNOSIS — R74.8 ELEVATED LIVER ENZYMES: ICD-10-CM

## 2024-07-29 PROBLEM — R73.01 IMPAIRED FASTING GLUCOSE: Status: RESOLVED | Noted: 2024-04-23 | Resolved: 2024-07-29

## 2024-07-29 PROCEDURE — 3048F LDL-C <100 MG/DL: CPT

## 2024-07-29 PROCEDURE — 3079F DIAST BP 80-89 MM HG: CPT

## 2024-07-29 PROCEDURE — 90715 TDAP VACCINE 7 YRS/> IM: CPT

## 2024-07-29 PROCEDURE — 99214 OFFICE O/P EST MOD 30 MIN: CPT

## 2024-07-29 PROCEDURE — 90472 IMMUNIZATION ADMIN EACH ADD: CPT

## 2024-07-29 PROCEDURE — 90677 PCV20 VACCINE IM: CPT

## 2024-07-29 PROCEDURE — 3062F POS MACROALBUMINURIA REV: CPT

## 2024-07-29 PROCEDURE — 3074F SYST BP LT 130 MM HG: CPT

## 2024-07-29 PROCEDURE — 3044F HG A1C LEVEL LT 7.0%: CPT

## 2024-07-29 PROCEDURE — 90471 IMMUNIZATION ADMIN: CPT

## 2024-07-29 RX ORDER — CYCLOBENZAPRINE HCL 10 MG
10 TABLET ORAL NIGHTLY PRN
Qty: 90 TABLET | Refills: 1 | Status: SHIPPED | OUTPATIENT
Start: 2024-07-29

## 2024-07-29 RX ORDER — DULAGLUTIDE 0.75 MG/.5ML
0.75 INJECTION, SOLUTION SUBCUTANEOUS
Qty: 2 ML | Refills: 11 | Status: CANCELLED | OUTPATIENT
Start: 2024-07-29

## 2024-07-29 RX ORDER — PEN NEEDLE, DIABETIC 30 GX3/16"
NEEDLE, DISPOSABLE MISCELLANEOUS
Status: CANCELLED | OUTPATIENT
Start: 2024-07-29

## 2024-07-29 RX ORDER — BLOOD-GLUCOSE SENSOR
EACH MISCELLANEOUS
Qty: 3 EACH | Refills: 11 | Status: SHIPPED | OUTPATIENT
Start: 2024-07-29

## 2024-07-29 RX ORDER — PEN NEEDLE, DIABETIC 30 GX3/16"
1 NEEDLE, DISPOSABLE MISCELLANEOUS DAILY
Qty: 100 EACH | Refills: 3 | Status: SHIPPED | OUTPATIENT
Start: 2024-07-29

## 2024-07-29 RX ORDER — MELOXICAM 15 MG/1
15 TABLET ORAL DAILY
Qty: 90 TABLET | Refills: 3 | Status: CANCELLED | OUTPATIENT
Start: 2024-07-29

## 2024-07-29 RX ORDER — METFORMIN HYDROCHLORIDE 1000 MG/1
1000 TABLET ORAL
Qty: 180 TABLET | Refills: 3 | Status: CANCELLED | OUTPATIENT
Start: 2024-07-29 | End: 2025-07-29

## 2024-07-29 RX ORDER — GLIPIZIDE 10 MG/1
10 TABLET, FILM COATED, EXTENDED RELEASE ORAL DAILY
Qty: 90 TABLET | Refills: 3 | Status: CANCELLED | OUTPATIENT
Start: 2024-07-29 | End: 2025-07-29

## 2024-07-29 RX ORDER — INSULIN GLARGINE 100 [IU]/ML
30 INJECTION, SOLUTION SUBCUTANEOUS NIGHTLY
Qty: 15 EACH | Refills: 3 | Status: SHIPPED | OUTPATIENT
Start: 2024-07-29

## 2024-07-29 RX ORDER — INSULIN GLARGINE-YFGN 100 [IU]/ML
30 INJECTION, SOLUTION SUBCUTANEOUS NIGHTLY
Qty: 15 ML | Refills: 6 | Status: SHIPPED | OUTPATIENT
Start: 2024-07-29

## 2024-07-29 RX ORDER — ATORVASTATIN CALCIUM 10 MG/1
10 TABLET, FILM COATED ORAL NIGHTLY
Qty: 90 TABLET | Refills: 3 | Status: CANCELLED | OUTPATIENT
Start: 2024-07-29

## 2024-07-29 SDOH — ECONOMIC STABILITY: FOOD INSECURITY: WITHIN THE PAST 12 MONTHS, THE FOOD YOU BOUGHT JUST DIDN'T LAST AND YOU DIDN'T HAVE MONEY TO GET MORE.: NEVER TRUE

## 2024-07-29 SDOH — ECONOMIC STABILITY: FOOD INSECURITY: WITHIN THE PAST 12 MONTHS, YOU WORRIED THAT YOUR FOOD WOULD RUN OUT BEFORE YOU GOT MONEY TO BUY MORE.: NEVER TRUE

## 2024-07-29 ASSESSMENT — ENCOUNTER SYMPTOMS
ENDOCRINE NEGATIVE: 1
CONSTITUTIONAL NEGATIVE: 1
NEUROLOGICAL NEGATIVE: 1
MUSCULOSKELETAL NEGATIVE: 1
EYES NEGATIVE: 1
GASTROINTESTINAL NEGATIVE: 1
CARDIOVASCULAR NEGATIVE: 1
PSYCHIATRIC NEGATIVE: 1
HEMATOLOGIC/LYMPHATIC NEGATIVE: 1
RESPIRATORY NEGATIVE: 1

## 2024-07-29 ASSESSMENT — LIFESTYLE VARIABLES
HOW OFTEN DO YOU HAVE SIX OR MORE DRINKS ON ONE OCCASION: NEVER
HOW OFTEN DO YOU HAVE A DRINK CONTAINING ALCOHOL: NEVER
HOW MANY STANDARD DRINKS CONTAINING ALCOHOL DO YOU HAVE ON A TYPICAL DAY: PATIENT DOES NOT DRINK
AUDIT-C TOTAL SCORE: 0
SKIP TO QUESTIONS 9-10: 1

## 2024-07-29 ASSESSMENT — PAIN SCALES - GENERAL: PAINLEVEL: 0-NO PAIN

## 2024-07-29 NOTE — PATIENT INSTRUCTIONS
Thank you for coming to see me today.  If you have any questions or concerns following our visit, please contact the office.  Phone: [phone]    Follow up with me in 6 months for diabetes    1)  Get non-fasting labwork a few days prior to next visit.  The lab is down the [address]omez from our office.

## 2024-07-29 NOTE — ASSESSMENT & PLAN NOTE
A1c well controlled at 6.6%  Using CGM    Continue Lantus 30 units daily, dulaglutide 0.75mg weekly, glipizide XL 10mg daily, metformin 1000mg twice daily and empagliflozin 25mg daily  Advised to keep follow up with clinical pharmacy in September  Repeat A1c prior to next visit

## 2024-07-29 NOTE — ASSESSMENT & PLAN NOTE
AST/ALT have resolved to normal ranges  Reiterated importance of not drinking more than 3-5 alcoholic beverages per week

## 2024-07-29 NOTE — PROGRESS NOTES
Subjective   Patient ID: Herman Fairbanks is a 48 y.o. male who presents for 3 month follow up of DM2.    Using CGM routinely, Dexcom G7 typically staying on very well. Notices readers are helping a lot with eating habits.     Has cut back on alcohol in the interim. Drinking a few beers when mowing grass or with friends/family.    Diet: Eating salads, wife cooks pretty healthy. Occasional smoked meats and chicken wings. Occasional candy, avoiding sodas  Exercise: On his feet a lot at work, cuts grass and shovels snow on his property. Walks the dogs  Sleep: Not as good lately, getting about 4 hours then up to use bathroom then sometimes having trouble getting back to sleep. 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     Review of Systems   Constitutional: Negative.    HENT: Negative.     Eyes: Negative.    Respiratory: Negative.     Cardiovascular: Negative.    Gastrointestinal: Negative.    Endocrine: Negative.    Genitourinary: Negative.    Musculoskeletal: Negative.    Skin: Negative.    Neurological: Negative.    Hematological: Negative.    Psychiatric/Behavioral: Negative.          Current Outpatient Medications   Medication Sig Dispense Refill    atorvastatin (Lipitor) 10 mg tablet TAKE 1 TABLET BY MOUTH ONCE DAILY AT BEDTIME 90 tablet 3    blood sugar diagnostic strip For TrueMetrix glucometer. Check blood glucose daily as directed 100 strip 3    dulaglutide (Trulicity) 0.75 mg/0.5 mL pen injector Inject 0.75 mg under the skin 1 (one) time per week. 2 mL 11    empagliflozin (Jardiance) 25 mg Take 1 tablet (25 mg) by mouth once daily. 30 tablet 11    glipiZIDE XL (Glucotrol XL) 10 mg 24 hr tablet Take 1 tablet (10 mg) by mouth once daily. Do not crush, chew, or split. 90 tablet 3    meloxicam (Mobic) 15 mg tablet Take 1 tablet (15 mg) by mouth once daily. 90 tablet 3  "   metFORMIN (Glucophage) 1,000 mg tablet Take 1 tablet (1,000 mg) by mouth 2 times a day with meals. 180 tablet 3    blood-glucose sensor (Dexcom G7 Sensor) device Place 1 sensor on arm or abdomen every 10 days 3 each 11    cyclobenzaprine (Flexeril) 10 mg tablet Take 1 tablet (10 mg) by mouth as needed at bedtime for muscle spasms. 90 tablet 1    insulin glargine (Lantus) 100 unit/mL (3 mL) pen Inject 30 Units under the skin once daily at bedtime. Inject 30 units at bedtime. 15 each 3    insulin glargine-yfgn (Semglee,insulin glarg-yfgn,Pen) 100 unit/mL (3 mL) Pen Inject 30 Units under the skin once daily at bedtime. 15 mL 6    pen needle, diabetic 32 gauge x 5/32\" needle 1 Pen needle once daily. To be used with lantus injections daily 100 each 3     No current facility-administered medications for this visit.     Past Surgical History:   Procedure Laterality Date    CIRCUMCISION, PRIMARY  Sept77    OTHER SURGICAL HISTORY  01/02/2020    Tonsillectomy    OTHER SURGICAL HISTORY  08/17/2021    Biceps tendon rupture repair     Family History   Problem Relation Name Age of Onset    Hyperlipidemia Mother M     Hypertension Father D     Diabetes Maternal Grandmother G     Heart attack Maternal Grandfather      Sudden death Maternal Grandfather      Diabetes Paternal Grandmother G       Social History     Tobacco Use    Smoking status: Some Days     Types: Cigars     Passive exposure: Current    Smokeless tobacco: Former   Vaping Use    Vaping status: Never Used   Substance Use Topics    Alcohol use: Yes     Alcohol/week: 3.0 standard drinks of alcohol     Types: 3 Cans of beer per week    Drug use: Never        Objective     Visit Vitals  /80 (BP Location: Left arm, Patient Position: Sitting)   Pulse 89   Temp 36.4 °C (97.5 °F) (Temporal)   Resp 12   Wt 93.9 kg (207 lb)   SpO2 97%   BMI 29.70 kg/m²   Smoking Status Some Days   BSA 2.15 m²        Physical Exam  Constitutional:       Appearance: Normal appearance. " "  HENT:      Head: Normocephalic and atraumatic.   Eyes:      Extraocular Movements: Extraocular movements intact.      Pupils: Pupils are equal, round, and reactive to light.   Pulmonary:      Effort: Pulmonary effort is normal.   Musculoskeletal:         General: Normal range of motion.   Skin:     General: Skin is warm and dry.      Capillary Refill: Capillary refill takes less than 2 seconds.   Neurological:      General: No focal deficit present.      Mental Status: He is alert and oriented to person, place, and time.   Psychiatric:         Mood and Affect: Mood normal.         Behavior: Behavior normal.           Assessment/Plan   Problem List Items Addressed This Visit       Chronic low back pain    Relevant Medications    cyclobenzaprine (Flexeril) 10 mg tablet    Controlled type 2 diabetes mellitus without complication, with long-term current use of insulin (Multi) - Primary     A1c well controlled at 6.6%  Using CGM    Continue Lantus 30 units daily, dulaglutide 0.75mg weekly, glipizide XL 10mg daily, metformin 1000mg twice daily and empagliflozin 25mg daily  Advised to keep follow up with clinical pharmacy in September  Repeat A1c prior to next visit         Relevant Medications    insulin glargine (Lantus) 100 unit/mL (3 mL) pen    insulin glargine-yfgn (Semglee,insulin glarg-yfgn,Pen) 100 unit/mL (3 mL) Pen    blood-glucose sensor (Dexcom G7 Sensor) device    pen needle, diabetic 32 gauge x 5/32\" needle    Hyperlipidemia, unspecified    RESOLVED: Elevated liver enzymes     AST/ALT have resolved to normal ranges  Reiterated importance of not drinking more than 3-5 alcoholic beverages per week            Other Visit Diagnoses       Controlled type 2 diabetes mellitus without complication, without long-term current use of insulin (Multi)        Relevant Medications    insulin glargine (Lantus) 100 unit/mL (3 mL) pen    blood-glucose sensor (Dexcom G7 Sensor) device    pen needle, diabetic 32 gauge x 5/32\" " needle    Other Relevant Orders    Hemoglobin A1C    Basic Metabolic Panel    CBC            All pertinent lab work and results were reviewed with patient.     Follow up with me in 6 months for DM2    Ana Gee, APRN-CNS

## 2024-08-05 LAB — NONINV COLON CA DNA+OCC BLD SCRN STL QL: NEGATIVE

## 2024-08-09 NOTE — PROGRESS NOTES
Pharmacist Clinic: Diabetes Management  Herman Fairbakns is a 48 y.o. male was referred to Clinical Pharmacy Team for diabetes management.   Referring Provider: Ana Gee AP*  - Last visit with referring provider: 4/23/24    Subjective     HPI    Current Diabetes Pharmacotherapy:    - Lantus 20 units in the morning  - Jardiance 25 mg daily  - Metformin 1000 mg twice daily  - Glipizide XL 10 mg daily in the morning  - Trulicity 0.75 mg weekly    Current diet:   - Wife cooks overall healthy  - Eating salads, occasional smoked meats, chicken wings  - Sometimes candy, avoiding sodas  - Drinks 4-5 water bottles/day  - Few beer socially    Current exercise:   - On his feet at work a lot  - Walks dogs    Current monitoring regimen:   Patient is using: continuous glucose monitor  Type of CGM: Dexcom G7    Reported blood sugars:   Report: 30 day  - Average: 126  - GMI: 6.3  - Very High: 1%  - High: 6%  - In range: 91%  - Low: 2%  - Very Low: < 1%    Any episodes of hypoglycemia? Yes  - 1-2 times a day in the morning  - Lows in the morning (10-11 am) about 1-2 times a week    Adverse Effects:   None     Objective     There were no vitals taken for this visit.    No Known Allergies    Historical Diabetes Pharmacotherapy:  N/A    SECONDARY PREVENTION  - Statin? Yes   - ACE-I/ARB? No  - Aspirin? No    Pertinent PMH Review:  - PMH of Pancreatitis: No  - PMH of Retinopathy: No  - PMH of Urinary Tract Infections: No  - PMH of MTC: No    Lab Review  Lab Results   Component Value Date    BILITOT 0.5 07/24/2024    CALCIUM 9.6 07/24/2024    CO2 24 07/24/2024     07/24/2024    CREATININE 1.07 07/24/2024    GLUCOSE 86 07/24/2024    ALKPHOS 47 07/24/2024    K 4.3 07/24/2024    PROT 6.6 07/24/2024     07/24/2024    AST 22 07/24/2024    ALT 48 07/24/2024    BUN 17 07/24/2024    ANIONGAP 12 07/24/2024    MG 1.82 08/11/2021    ALBUMIN 4.4 07/24/2024    GFRMALE 85 07/17/2023     Lab Results   Component Value Date    TRIG  145 07/24/2024    CHOL 102 07/24/2024    HDL 40.3 07/24/2024     Lab Results   Component Value Date    HGBA1C 6.6 (H) 07/24/2024    HGBA1C 7.0 (H) 04/19/2024    HGBA1C 7.2 (H) 01/18/2024     The ASCVD Risk score (Allyson LUCAS, et al., 2019) failed to calculate for the following reasons:    The valid total cholesterol range is 130 to 320 mg/dL    Drug Interactions:  No issues    Affordability/Accessibility:  - Trulicity needed PA    Assessment/Plan   Problem List Items Addressed This Visit    None  Visit Diagnoses       Controlled type 2 diabetes mellitus without complication, without long-term current use of insulin (Multi)        Relevant Medications    glipiZIDE XL (Glucotrol XL) 5 mg 24 hr tablet    Other Relevant Orders    Follow Up In Clinical Pharmacy          ASSESSMENT:  Patients diabetes is controlled with most recent A1c of 6.6%.     Patient and PCP's goal is to get off insulin eventually. He is having about ~1% very low and 1% low readings. States this happens in the morning. He does not take Glipizide with food. I emphasized importance of taking with breakfast to avoid low blood sugars. Since he is having very low readings, we will decrease the Glipizide from 10 mg to 5 mg and decrease Lantus from 20 units to 15 units. I told him he may notice sugars going up a bit but next appointment we will evaluate if he is having any lows and will titrate up on Trulicity.      PLAN:  DECREASE Lantus to 15 units in the morning  DECREASE Glipizide to 5 mg in the morning (with breakfast)  Follow up with clinical pharmacist: 9/3/24 @ 9:30   Follow up with PCP: 7/29/24    Thank you,  Therese Sterling, PharmD  Clinical Pharmacy Specialist - Primary Care  [phone]  [email or web address]      Continue all meds under the continuation of care with the referring provider and clinical pharmacy team.

## 2024-08-12 ENCOUNTER — APPOINTMENT (OUTPATIENT)
Dept: PHARMACY | Facility: HOSPITAL | Age: 49
End: 2024-08-12
Payer: COMMERCIAL

## 2024-08-12 DIAGNOSIS — E11.9 CONTROLLED TYPE 2 DIABETES MELLITUS WITHOUT COMPLICATION, WITHOUT LONG-TERM CURRENT USE OF INSULIN (MULTI): ICD-10-CM

## 2024-08-12 RX ORDER — GLIPIZIDE 5 MG/1
5 TABLET, FILM COATED, EXTENDED RELEASE ORAL
Qty: 60 TABLET | Refills: 2 | Status: SHIPPED | OUTPATIENT
Start: 2024-08-12 | End: 2025-02-08

## 2024-09-03 ENCOUNTER — APPOINTMENT (OUTPATIENT)
Dept: PHARMACY | Facility: HOSPITAL | Age: 49
End: 2024-09-03
Payer: COMMERCIAL

## 2024-09-03 DIAGNOSIS — E11.9 CONTROLLED TYPE 2 DIABETES MELLITUS WITHOUT COMPLICATION, WITHOUT LONG-TERM CURRENT USE OF INSULIN (MULTI): ICD-10-CM

## 2024-09-03 RX ORDER — DULAGLUTIDE 1.5 MG/.5ML
1.5 INJECTION, SOLUTION SUBCUTANEOUS WEEKLY
Qty: 2 ML | Refills: 1 | Status: SHIPPED | OUTPATIENT
Start: 2024-09-03

## 2024-09-03 NOTE — PROGRESS NOTES
Pharmacist Clinic: Diabetes Management  Herman Fairbanks is a 49 y.o. male was referred to Clinical Pharmacy Team for diabetes management.   Referring Provider: Ana Gee AP*  - Last visit with referring provider: 4/23/24    Subjective     HPI    Current Diabetes Pharmacotherapy:    - Lantus 15 units in the morning  - Jardiance 25 mg daily  - Metformin 1000 mg twice daily  - Glipizide XL 5 mg daily in the morning  - Trulicity 0.75 mg weekly (Wednesdays)    Current diet:   - Wife cooks overall healthy  - Eating salads, occasional smoked meats, chicken wings  - Sometimes candy, avoiding sodas  - Drinks 4-5 water bottles/day  - Few beer socially    Current exercise:   - On his feet at work a lot  - Walks dogs    Current monitoring regimen:   Patient is using: continuous glucose monitor  Type of CGM: Dexcom G7    Reported blood sugars:   Report: 30 day  - Average: 130   - GMI: 6.4%  - Very High: < 1%  - High: 8%  - In range: 89%  - Low: 2%  - Very Low: < 1%    Any episodes of hypoglycemia? Yes  - Has a low once a week/every 5 days (mornings after medications)  - Has not been as often since decreasing Lantus/Glipizide    Adverse Effects:   None    Objective     There were no vitals taken for this visit.    No Known Allergies    Historical Diabetes Pharmacotherapy:  N/A    SECONDARY PREVENTION  - Statin? Yes   - ACE-I/ARB? No  - Aspirin? No    Pertinent PMH Review:  - PMH of Pancreatitis: No  - PMH of Retinopathy: No  - PMH of Urinary Tract Infections: No  - PMH of MTC: No    Lab Review  Lab Results   Component Value Date    BILITOT 0.5 07/24/2024    CALCIUM 9.6 07/24/2024    CO2 24 07/24/2024     07/24/2024    CREATININE 1.07 07/24/2024    GLUCOSE 86 07/24/2024    ALKPHOS 47 07/24/2024    K 4.3 07/24/2024    PROT 6.6 07/24/2024     07/24/2024    AST 22 07/24/2024    ALT 48 07/24/2024    BUN 17 07/24/2024    ANIONGAP 12 07/24/2024    MG 1.82 08/11/2021    ALBUMIN 4.4 07/24/2024    GFRMALE 85  07/17/2023     Lab Results   Component Value Date    TRIG 145 07/24/2024    CHOL 102 07/24/2024    HDL 40.3 07/24/2024     Lab Results   Component Value Date    HGBA1C 6.6 (H) 07/24/2024    HGBA1C 7.0 (H) 04/19/2024    HGBA1C 7.2 (H) 01/18/2024     The ASCVD Risk score (Allyson LUCAS, et al., 2019) failed to calculate for the following reasons:    The valid total cholesterol range is 130 to 320 mg/dL    Drug Interactions:  No issues    Affordability/Accessibility:  - Trulicity needed PA    Assessment/Plan   Problem List Items Addressed This Visit    None  Visit Diagnoses       Controlled type 2 diabetes mellitus without complication, without long-term current use of insulin (Multi)        Relevant Medications    dulaglutide (Trulicity) 1.5 mg/0.5 mL pen injector injection    Other Relevant Orders    Follow Up In Clinical Pharmacy            ASSESSMENT:  Patients diabetes is controlled with most recent A1c of 6.6%.     Patient and PCP's goal is to get off insulin eventually. He feels like he is not having as many lows as last time. Has been doing better since we decreased Lantus and Glipizide. Today we will increase Trulicity and decrease Lantus a little more to 12 units. Next appointment we can evaluate if we can take him completely off Lantus while titrating up Trulicity.      PLAN:  DECREASE Lantus to 12 units every morning  INCREASE Trulicity to 1.5 mg weekly  CONTINUE all other DM medications   Follow up with clinical pharmacist: 10/1/24 @ 10  Follow up with PCP: 1/28/25    Thank you,  Therese Sterling, PharmD  Clinical Pharmacy Specialist - Primary Care  [phone]  [email or web address]      Continue all meds under the continuation of care with the referring provider and clinical pharmacy team.

## 2024-09-30 NOTE — PROGRESS NOTES
Pharmacist Clinic: Diabetes Management  Herman Fairbanks is a 49 y.o. male was referred to Clinical Pharmacy Team for diabetes management.   Referring Provider: Ana Gee AP*  - Last visit with referring provider: 4/23/24    Subjective     HPI    Current Diabetes Pharmacotherapy:    - Lantus 12 units in the morning  - Jardiance 25 mg daily  - Metformin 1000 mg twice daily  - Glipizide XL 5 mg daily in the morning  - Trulicity 1.5 mg weekly (Wednesdays) - tomorrow is 4th dose    Current diet:   - Wife cooks overall healthy  - Eating salads, occasional smoked meats, chicken wings  - Sometimes candy, avoiding sodas  - Drinks 4-5 water bottles/day  - Few beer socially    Current exercise:   - On his feet at work a lot  - Walks dogs    Current monitoring regimen:   Patient is using: continuous glucose monitor  Type of CGM: Dexcom G7    Reported blood sugars:   Report: 30 day  - Average: 148   - GMI: 6.9%  - Very High: < 1%  - High: 17%  - In range: 82%  - Low: <1%    Any episodes of hypoglycemia? Not as much any more  - Denies any recently  - Was having them in mornings prior    Adverse Effects:   None    Objective     There were no vitals taken for this visit.    No Known Allergies    Historical Diabetes Pharmacotherapy:  N/A    SECONDARY PREVENTION  - Statin? Yes   - ACE-I/ARB? No  - Aspirin? No    Pertinent PMH Review:  - PMH of Pancreatitis: No  - PMH of Retinopathy: No  - PMH of Urinary Tract Infections: No  - PMH of MTC: No    Lab Review  Lab Results   Component Value Date    BILITOT 0.5 07/24/2024    CALCIUM 9.6 07/24/2024    CO2 24 07/24/2024     07/24/2024    CREATININE 1.07 07/24/2024    GLUCOSE 86 07/24/2024    ALKPHOS 47 07/24/2024    K 4.3 07/24/2024    PROT 6.6 07/24/2024     07/24/2024    AST 22 07/24/2024    ALT 48 07/24/2024    BUN 17 07/24/2024    ANIONGAP 12 07/24/2024    MG 1.82 08/11/2021    ALBUMIN 4.4 07/24/2024    GFRMALE 85 07/17/2023     Lab Results   Component Value Date     "TRIG 145 07/24/2024    CHOL 102 07/24/2024    HDL 40.3 07/24/2024     Lab Results   Component Value Date    HGBA1C 6.6 (H) 07/24/2024    HGBA1C 7.0 (H) 04/19/2024    HGBA1C 7.2 (H) 01/18/2024     The ASCVD Risk score (Allyson LUCAS, et al., 2019) failed to calculate for the following reasons:    The valid total cholesterol range is 130 to 320 mg/dL    Drug Interactions:  No issues    Affordability/Accessibility:  - Trulicity needed PA    Assessment/Plan   Problem List Items Addressed This Visit    None  Visit Diagnoses       Controlled type 2 diabetes mellitus without complication, without long-term current use of insulin (Multi)        Relevant Medications    blood-glucose sensor (Dexcom G7 Sensor) device    empagliflozin (Jardiance) 25 mg    glipiZIDE XL (Glucotrol XL) 5 mg 24 hr tablet    insulin glargine (Lantus) 100 unit/mL (3 mL) pen    pen needle, diabetic 32 gauge x 5/32\" needle    metFORMIN (Glucophage) 1,000 mg tablet    dulaglutide (Trulicity) 3 mg/0.5 mL pen injector    Other Relevant Orders    Follow Up In Clinical Pharmacy            ASSESSMENT:  Patients diabetes is controlled with most recent A1c of 6.6%.     Patient tolerating dose increase of Trulicity. Having less lows with Lantus decrease. Due to highs, we will increase Trulicity to 3 mg. He would like all of his DM medications to be mailed to him through . I will send to  [facility] for mail delivery. He also is due for A1c and to get before our next appointment.    PLAN:  INCREASE Trulicity to 3 mg weekly  CONTINUE all other DM medications  Follow up with clinical pharmacist: 10/29/24 @ 10   Follow up with PCP: 1/28/25    Thank you,  Therese Sterling, PharmD  Clinical Pharmacy Specialist - Primary Care  [phone]  [email or web address]      Continue all meds under the continuation of care with the referring provider and clinical pharmacy team.  "

## 2024-10-01 ENCOUNTER — APPOINTMENT (OUTPATIENT)
Dept: PHARMACY | Facility: HOSPITAL | Age: 49
End: 2024-10-01
Payer: COMMERCIAL

## 2024-10-01 DIAGNOSIS — E11.9 CONTROLLED TYPE 2 DIABETES MELLITUS WITHOUT COMPLICATION, WITHOUT LONG-TERM CURRENT USE OF INSULIN (MULTI): ICD-10-CM

## 2024-10-01 PROCEDURE — RXMED WILLOW AMBULATORY MEDICATION CHARGE

## 2024-10-01 RX ORDER — DULAGLUTIDE 3 MG/.5ML
3 INJECTION, SOLUTION SUBCUTANEOUS
Qty: 2 ML | Refills: 1 | Status: SHIPPED | OUTPATIENT
Start: 2024-10-01

## 2024-10-01 RX ORDER — DULAGLUTIDE 3 MG/.5ML
3 INJECTION, SOLUTION SUBCUTANEOUS
Qty: 2 ML | Refills: 1 | Status: SHIPPED | OUTPATIENT
Start: 2024-10-06 | End: 2024-10-01 | Stop reason: ENTERED-IN-ERROR

## 2024-10-01 RX ORDER — METFORMIN HYDROCHLORIDE 1000 MG/1
1000 TABLET ORAL
Qty: 180 TABLET | Refills: 3 | Status: SHIPPED | OUTPATIENT
Start: 2024-10-01 | End: 2025-10-01

## 2024-10-01 RX ORDER — GLIPIZIDE 5 MG/1
5 TABLET, FILM COATED, EXTENDED RELEASE ORAL
Qty: 60 TABLET | Refills: 5 | Status: SHIPPED | OUTPATIENT
Start: 2024-10-01 | End: 2025-09-26

## 2024-10-01 RX ORDER — BLOOD-GLUCOSE SENSOR
EACH MISCELLANEOUS
Qty: 3 EACH | Refills: 11 | Status: SHIPPED | OUTPATIENT
Start: 2024-10-01

## 2024-10-01 RX ORDER — PEN NEEDLE, DIABETIC 30 GX3/16"
1 NEEDLE, DISPOSABLE MISCELLANEOUS DAILY
Qty: 100 EACH | Refills: 11 | Status: SHIPPED | OUTPATIENT
Start: 2024-10-01

## 2024-10-01 RX ORDER — INSULIN GLARGINE 100 [IU]/ML
12 INJECTION, SOLUTION SUBCUTANEOUS EVERY MORNING
Qty: 15 ML | Refills: 0 | Status: SHIPPED | OUTPATIENT
Start: 2024-10-01

## 2024-10-03 ENCOUNTER — PHARMACY VISIT (OUTPATIENT)
Dept: PHARMACY | Facility: CLINIC | Age: 49
End: 2024-10-03
Payer: COMMERCIAL

## 2024-10-28 PROCEDURE — RXMED WILLOW AMBULATORY MEDICATION CHARGE

## 2024-10-29 ENCOUNTER — PHARMACY VISIT (OUTPATIENT)
Dept: PHARMACY | Facility: CLINIC | Age: 49
End: 2024-10-29
Payer: COMMERCIAL

## 2024-10-29 ENCOUNTER — APPOINTMENT (OUTPATIENT)
Dept: PHARMACY | Facility: HOSPITAL | Age: 49
End: 2024-10-29
Payer: COMMERCIAL

## 2024-10-29 DIAGNOSIS — E11.9 CONTROLLED TYPE 2 DIABETES MELLITUS WITHOUT COMPLICATION, WITHOUT LONG-TERM CURRENT USE OF INSULIN (MULTI): ICD-10-CM

## 2024-11-13 PROCEDURE — RXMED WILLOW AMBULATORY MEDICATION CHARGE

## 2024-11-14 ENCOUNTER — PHARMACY VISIT (OUTPATIENT)
Dept: PHARMACY | Facility: CLINIC | Age: 49
End: 2024-11-14
Payer: COMMERCIAL

## 2024-11-21 DIAGNOSIS — E11.9 CONTROLLED TYPE 2 DIABETES MELLITUS WITHOUT COMPLICATION, WITHOUT LONG-TERM CURRENT USE OF INSULIN (MULTI): ICD-10-CM

## 2024-11-21 RX ORDER — DULAGLUTIDE 3 MG/.5ML
3 INJECTION, SOLUTION SUBCUTANEOUS
Qty: 2 ML | Refills: 1 | Status: CANCELLED | OUTPATIENT
Start: 2024-11-24

## 2024-11-25 DIAGNOSIS — E11.9 CONTROLLED TYPE 2 DIABETES MELLITUS WITHOUT COMPLICATION, WITHOUT LONG-TERM CURRENT USE OF INSULIN (MULTI): ICD-10-CM

## 2024-11-25 PROCEDURE — RXMED WILLOW AMBULATORY MEDICATION CHARGE

## 2024-11-25 RX ORDER — DULAGLUTIDE 3 MG/.5ML
3 INJECTION, SOLUTION SUBCUTANEOUS
Qty: 2 ML | Refills: 1 | OUTPATIENT
Start: 2024-12-01

## 2024-11-26 ENCOUNTER — PHARMACY VISIT (OUTPATIENT)
Dept: PHARMACY | Facility: CLINIC | Age: 49
End: 2024-11-26
Payer: COMMERCIAL

## 2024-11-26 ENCOUNTER — APPOINTMENT (OUTPATIENT)
Dept: PHARMACY | Facility: HOSPITAL | Age: 49
End: 2024-11-26
Payer: COMMERCIAL

## 2024-11-27 DIAGNOSIS — E11.9 CONTROLLED TYPE 2 DIABETES MELLITUS WITHOUT COMPLICATION, WITHOUT LONG-TERM CURRENT USE OF INSULIN (MULTI): ICD-10-CM

## 2024-12-02 RX ORDER — DULAGLUTIDE 3 MG/.5ML
3 INJECTION, SOLUTION SUBCUTANEOUS
Qty: 2 ML | Refills: 1 | OUTPATIENT
Start: 2024-12-08

## 2024-12-02 NOTE — PROGRESS NOTES
Pharmacist Clinic: Diabetes Management  Herman Fairbanks is a 49 y.o. male was referred to Clinical Pharmacy Team for diabetes management.   Referring Provider: Ana Gee AP*  - Last visit with referring provider: 4/23/24    Subjective     HPI    Current Diabetes Pharmacotherapy:    - Lantus 12 units in the morning  - Jardiance 25 mg daily  - Metformin 1000 mg twice daily  - Glipizide XL 5 mg daily in the morning  - Trulicity 3 mg weekly (Wednesdays)     Current diet:   - Wife cooks overall healthy  - Eating salads, occasional smoked meats, chicken wings  - Sometimes candy, avoiding sodas  - Drinks 4-5 water bottles/day  - Few beer socially  - Works 8 am to 8 pm     Current exercise:   - On his feet at work a lot  - Walks dogs    Current monitoring regimen:   Patient is using: continuous glucose monitor  Type of CGM: Dexcom G7 - connected to clinic     Reported blood sugars:   See APG Report  - Patient was sick with upper respiratory infection through Thanksgiving to recently   - Has been eating  cupcakes lately     Any episodes of hypoglycemia? No  - One low reported in CGM report     Adverse Effects: None            Objective     There were no vitals taken for this visit.    No Known Allergies    Historical Diabetes Pharmacotherapy:  N/A    SECONDARY PREVENTION  - Statin? Yes   - ACE-I/ARB? No  - Aspirin? No    Pertinent PMH Review:  - PMH of Pancreatitis: No  - PMH of Retinopathy: No  - PMH of Urinary Tract Infections: No  - PMH of MTC: No    Lab Review  Lab Results   Component Value Date    BILITOT 0.5 07/24/2024    CALCIUM 9.6 07/24/2024    CO2 24 07/24/2024     07/24/2024    CREATININE 1.07 07/24/2024    GLUCOSE 86 07/24/2024    ALKPHOS 47 07/24/2024    K 4.3 07/24/2024    PROT 6.6 07/24/2024     07/24/2024    AST 22 07/24/2024    ALT 48 07/24/2024    BUN 17 07/24/2024    ANIONGAP 12 07/24/2024    MG 1.82 08/11/2021    ALBUMIN 4.4 07/24/2024    GFRMALE 85 07/17/2023     Lab Results    Component Value Date    TRIG 145 07/24/2024    CHOL 102 07/24/2024    HDL 40.3 07/24/2024     Lab Results   Component Value Date    HGBA1C 6.6 (H) 07/24/2024    HGBA1C 7.0 (H) 04/19/2024    HGBA1C 7.2 (H) 01/18/2024     The ASCVD Risk score (Allyson LUCAS, et al., 2019) failed to calculate for the following reasons:    The valid total cholesterol range is 130 to 320 mg/dL    Drug Interactions:  No issues    Affordability/Accessibility:  - Trulicity needed PA    Assessment/Plan   Problem List Items Addressed This Visit    None  Visit Diagnoses       Controlled type 2 diabetes mellitus without complication, without long-term current use of insulin (Multi)        Relevant Medications    dulaglutide (Trulicity) 4.5 mg/0.5 mL pen injector    Other Relevant Orders    Hemoglobin A1c    Referral to Clinical Pharmacy            ASSESSMENT:  Patients diabetes is controlled with most recent A1c of 6.6%.     Patients blood sugars have increased lately. He has been sick with URI for a few days now. Has also been eating  cupcakes. Educated on diet. To help with some of the post-prandial highs, I will increase Trulicity to 4.5 mg. I told him if he starts to go low throughout the day, to decrease Lantus to about 8-10 units. Patient verbalizes understanding. He is also to get an A1c done.    PLAN:  INCREASE Trulicity to 4.5 mg weekly   CONTINUE current DM medications  Get A1c  Follow up with clinical pharmacist: 12/31/24 @ 9:40   Follow up with PCP: 1/28/25    Thank you,  Therese Sterling, PharmD  Clinical Pharmacy Specialist - Primary Care  [phone]  [email or web address]      Continue all meds under the continuation of care with the referring provider and clinical pharmacy team.

## 2024-12-03 ENCOUNTER — TELEMEDICINE (OUTPATIENT)
Dept: PHARMACY | Facility: HOSPITAL | Age: 49
End: 2024-12-03
Payer: COMMERCIAL

## 2024-12-03 DIAGNOSIS — E11.9 CONTROLLED TYPE 2 DIABETES MELLITUS WITHOUT COMPLICATION, WITHOUT LONG-TERM CURRENT USE OF INSULIN (MULTI): ICD-10-CM

## 2024-12-03 PROCEDURE — RXMED WILLOW AMBULATORY MEDICATION CHARGE

## 2024-12-03 RX ORDER — DULAGLUTIDE 4.5 MG/.5ML
0.75 INJECTION, SOLUTION SUBCUTANEOUS WEEKLY
Qty: 2 ML | Refills: 2 | Status: SHIPPED | OUTPATIENT
Start: 2024-12-03 | End: 2024-12-03 | Stop reason: ENTERED-IN-ERROR

## 2024-12-03 RX ORDER — DULAGLUTIDE 4.5 MG/.5ML
4.5 INJECTION, SOLUTION SUBCUTANEOUS WEEKLY
Qty: 2 ML | Refills: 2 | Status: SHIPPED | OUTPATIENT
Start: 2024-12-03

## 2024-12-05 ENCOUNTER — PHARMACY VISIT (OUTPATIENT)
Dept: PHARMACY | Facility: CLINIC | Age: 49
End: 2024-12-05
Payer: COMMERCIAL

## 2024-12-11 PROCEDURE — RXMED WILLOW AMBULATORY MEDICATION CHARGE

## 2024-12-13 ENCOUNTER — PHARMACY VISIT (OUTPATIENT)
Dept: PHARMACY | Facility: CLINIC | Age: 49
End: 2024-12-13
Payer: COMMERCIAL

## 2024-12-26 PROCEDURE — RXMED WILLOW AMBULATORY MEDICATION CHARGE

## 2024-12-31 ENCOUNTER — APPOINTMENT (OUTPATIENT)
Dept: PHARMACY | Facility: HOSPITAL | Age: 49
End: 2024-12-31
Payer: COMMERCIAL

## 2024-12-31 ENCOUNTER — PHARMACY VISIT (OUTPATIENT)
Dept: PHARMACY | Facility: CLINIC | Age: 49
End: 2024-12-31
Payer: COMMERCIAL

## 2025-01-02 ENCOUNTER — PHARMACY VISIT (OUTPATIENT)
Dept: PHARMACY | Facility: CLINIC | Age: 50
End: 2025-01-02

## 2025-01-23 DIAGNOSIS — E11.9 CONTROLLED TYPE 2 DIABETES MELLITUS WITHOUT COMPLICATION, WITHOUT LONG-TERM CURRENT USE OF INSULIN (MULTI): ICD-10-CM

## 2025-01-23 PROCEDURE — RXMED WILLOW AMBULATORY MEDICATION CHARGE

## 2025-01-23 RX ORDER — INSULIN GLARGINE 100 [IU]/ML
12 INJECTION, SOLUTION SUBCUTANEOUS EVERY MORNING
Qty: 15 ML | Refills: 0 | OUTPATIENT
Start: 2025-01-23

## 2025-01-24 ENCOUNTER — PHARMACY VISIT (OUTPATIENT)
Dept: PHARMACY | Facility: CLINIC | Age: 50
End: 2025-01-24
Payer: COMMERCIAL

## 2025-01-27 ENCOUNTER — PHARMACY VISIT (OUTPATIENT)
Dept: PHARMACY | Facility: CLINIC | Age: 50
End: 2025-01-27

## 2025-01-28 ENCOUNTER — APPOINTMENT (OUTPATIENT)
Dept: PRIMARY CARE | Facility: CLINIC | Age: 50
End: 2025-01-28
Payer: COMMERCIAL